# Patient Record
Sex: FEMALE | Race: WHITE | NOT HISPANIC OR LATINO | Employment: FULL TIME
[De-identification: names, ages, dates, MRNs, and addresses within clinical notes are randomized per-mention and may not be internally consistent; named-entity substitution may affect disease eponyms.]

---

## 2017-07-08 ENCOUNTER — HEALTH MAINTENANCE LETTER (OUTPATIENT)
Age: 42
End: 2017-07-08

## 2017-10-11 NOTE — PROGRESS NOTES
SUBJECTIVE:  Lisa Colbert is a 42 year old female presents for   Chief Complaint   Patient presents with     Physical     Patient here for annual physical      Ms. Colbert is doing well and has no specific complaints. She is doing well and has no complaints. Has changed jobs- worked for [employer] but stress was high. Now works for [employer] and is thriving. Exercises daily for 30 min am (gets up at 4:45 am). Supportive marital relationship and kids healthy. Was planning to complete BRCA testing (aunt with BRCA + breast cancer) but has reconsidered given Trump administration overtures to repealing [place] prohibition on discriminating for a pre-existing condition. Discussed need for annual mammography.    Medications and allergies were reviewed by me today.   Current Outpatient Prescriptions   Medication     Multiple Vitamin (MULTIVITAMINS PO)     No current facility-administered medications for this visit.        Family History  Paternal aunt with BRCA-1  Father  of stomach cancer 18 years ago  Mother went through menopause at 44    SH    Healthy kids  Works at [employer]  No tobacco  Minimal alcohol    Review Of Systems    Constitutional: no fevers, chills, night sweats or unintentional weight change   Eyes: vision change- needs new Rx for glasses, diplopia or red eyes   Ears, Nose, Mouth, Throat: no tinnitus or hearing change, no epistaxis or nasal discharge, no oral lesions, throat clear   Cardiovascular: no chest pain, palpitations, or pain with walking, no orthopnea or PND   Respiratory: no dyspnea, cough, shortness of breath or wheezing   GI: no nausea, vomiting, diarrhea or constipation, no abdominal pain   : no change in urine, no dysuria or hematuria, no sexual dysfunction .18-21 d menstrual cycle with cramps/bloating but some variation.  Musculoskeletal: no joint or muscle pain or swelling   Integumentary: no concerning lesions or moles   Neuro: no loss of strength or  "sensation, no numbness or tingling, no tremor, no dizziness, no headache   Endo: no polyuria or polydipsia, no temperature intolerance   Heme/Lymph: no concerning bumps, no bleeding problems   Allergy: no environmental allergies   Psych: no depression or anxiety, no sleep problems      PHYSICAL EXAM:  /78  Pulse 81  Temp 98.1  F (36.7  C) (Oral)  Ht 1.652 m (5' 5.04\")  Wt 73.4 kg (161 lb 14.4 oz)  SpO2 98%  Breastfeeding? No  BMI 26.91 kg/m2      Constitutional: no distress, comfortable, pleasant   Eyes: anicteric, normal extra-ocular movements   Ears, Nose and Throat: tympanic membranes clear, nose clear and free of lesions, throat clear, neck supple with full range of motion, no thyromegaly.   Cardiovascular: regular rate and rhythm, normal S1 and S2, no murmurs, rubs or gallops, peripheral pulses full and symmetric   Respiratory: clear to auscultation, no wheezes or crackles, normal breath sounds   Gastrointestinal: positive bowel sounds, nontender, no hepatosplenomegaly, no masses   Musculoskeletal: full range of motion, no edema   Skin: 1 mole on upper right breast photographed as below for follow-up purposes, no jaundice     Neurological: cranial nerves intact, normal strength and sensation, reflexes at patella and biceps normal, normal gait, no tremor   Psychological: appropriate mood   Lymphatic: no cervical, axillary or inguinal lymphadenopathy    ASSESSMENT/PLAN:    1) HCM. Immunizations UTD- will get flu shot as family on Saturday. Pap nl with negative HPV testing in 2014- repeat in 2019. Breast screening with Mammography today- still concerned about her risk given FH of BRCA-1. Given the current political uncertainly around the effects of a pre-existing condition, opts out of BRCA-1/2 screening this year. Diet/exercise excellent. Work/life blend is good. PHQ-2 Score: 0  The 10-year ASCVD risk score (Allenbj SANTANA Jr, et al., 2013) is: 0.5%    Values used to calculate the score:      Age: 42 " years      Sex: Female      Is Non- : No      Diabetic: No      Tobacco smoker: No      Systolic Blood Pressure: 111 mmHg      Is BP treated: No      HDL Cholesterol: 55 mg/dL      Total Cholesterol: 196 mg/dL  Check hgb given ongoing menstruation. Recheck lipids then q5y if remain nl.  2) H/O Diverticulitis. No recurrence. No changes.        Geovani Nowak

## 2017-10-12 ENCOUNTER — OFFICE VISIT (OUTPATIENT)
Dept: INTERNAL MEDICINE | Facility: CLINIC | Age: 42
End: 2017-10-12

## 2017-10-12 VITALS
TEMPERATURE: 98.1 F | WEIGHT: 161.9 LBS | DIASTOLIC BLOOD PRESSURE: 78 MMHG | BODY MASS INDEX: 26.98 KG/M2 | SYSTOLIC BLOOD PRESSURE: 111 MMHG | HEART RATE: 81 BPM | OXYGEN SATURATION: 98 % | HEIGHT: 65 IN

## 2017-10-12 DIAGNOSIS — Z00.00 ROUTINE GENERAL MEDICAL EXAMINATION AT A HEALTH CARE FACILITY: Primary | ICD-10-CM

## 2017-10-12 DIAGNOSIS — Z00.00 ROUTINE GENERAL MEDICAL EXAMINATION AT A HEALTH CARE FACILITY: ICD-10-CM

## 2017-10-12 LAB
CHOLEST SERPL-MCNC: 204 MG/DL
HDLC SERPL-MCNC: 64 MG/DL
HGB BLD-MCNC: 13.5 G/DL (ref 11.7–15.7)
LDLC SERPL CALC-MCNC: 122 MG/DL
NONHDLC SERPL-MCNC: 140 MG/DL
TRIGL SERPL-MCNC: 88 MG/DL

## 2017-10-12 ASSESSMENT — PAIN SCALES - GENERAL: PAINLEVEL: NO PAIN (0)

## 2017-10-12 NOTE — MR AVS SNAPSHOT
After Visit Summary   10/12/2017    Lisa Colbert    MRN: [id number]           Patient Information     Date Of Birth          1975        Visit Information        Provider Department      10/12/2017 7:00 AM Geovani Nowak MD [facility]        Today's Diagnoses     Routine general medical examination at a health care facility    -  1       Follow-ups after your visit        Follow-up notes from your care team     Return in about 1 year (around 10/12/2018).      Who to contact     Please call your clinic at [phone] to:    Ask questions about your health    Make or cancel appointments    Discuss your medicines    Learn about your test results    Speak to your doctor   If you have compliments or concerns about an experience at your clinic, or if you wish to file a complaint, please contact [facility] Patient Relations at [phone] or email us at [email or web address]         Additional Information About Your Visit        MyChart Information     [employer] gives you secure access to your electronic health record. If you see a primary care provider, you can also send messages to your care team and make appointments. If you have questions, please call your primary care clinic.  If you do not have a primary care provider, please call [phone] and they will assist you.      [employer] is an electronic gateway that provides easy, online access to your medical records. With [employer], you can request a clinic appointment, read your test results, renew a prescription or communicate with your care team.     To access your existing account, please contact your [facility] or call [phone] for assistance.        Care EveryWhere ID     This is your Care EveryWhere ID. This could be used by other organizations to access your [facility] medical records  [id number]        Your Vitals Were     Pulse Temperature  "Height Pulse Oximetry Breastfeeding? BMI (Body Mass Index)    81 98.1  F (36.7  C) (Oral) 1.652 m (5' 5.04\") 98% No 26.91 kg/m2       Blood Pressure from Last 3 Encounters:   10/12/17 111/78   08/18/15 124/66   07/07/15 131/76    Weight from Last 3 Encounters:   10/12/17 73.4 kg (161 lb 14.4 oz)   08/18/15 71.6 kg (157 lb 12.8 oz)   11/19/14 71.5 kg (157 lb 11.2 oz)               Primary Care Provider Office Phone # Fax #    Geovani Don Nowak [phone]       [address] [address]        Equal Access to Services     SHOBHA SOUZA : Hadii angeline hunt hadasho Soluke, waaxda luqadaha, qaybta kaalmada adeegyada, ajay simmons . So [facility] [phone].    ATENCIÓN: Si habla español, tiene a brown disposición servicios gratuitos de asistencia lingüística. Jelly al [phone].    We comply with applicable federal civil rights laws and [place] laws. We do not discriminate on the basis of race, color, national origin, age, disability, sex, sexual orientation, or gender identity.            Thank you!     Thank you for choosing [facility]  for your care. Our goal is always to provide you with excellent care. Hearing back from our patients is one way we can continue to improve our services. Please take a few minutes to complete the written survey that you may receive in the mail after your visit with us. Thank you!             Your Updated Medication List - Protect others around you: Learn how to safely use, store and throw away your medicines at www.disposemymeds.org.          This list is accurate as of: 10/12/17  9:04 AM.  Always use your most recent med list.                   Brand Name Dispense Instructions for use Diagnosis    MULTIVITAMINS PO      Take 1 tablet by mouth daily    Routine general medical examination at a health care facility, Irregular menstrual cycle         "

## 2017-10-12 NOTE — NURSING NOTE
Chief Complaint   Patient presents with     Physical     Patient here for annual physical     Nirali Rodriguez LPN at 7:07 AM on 10/12/2017.

## 2018-10-10 ENCOUNTER — OFFICE VISIT (OUTPATIENT)
Dept: OPHTHALMOLOGY | Facility: CLINIC | Age: 43
End: 2018-10-10
Payer: COMMERCIAL

## 2018-10-10 DIAGNOSIS — S05.01XD INJ CONJUNCTIVA AND CORNEAL ABRASION W/O FB, RIGHT EYE, SUBS: Primary | ICD-10-CM

## 2018-10-10 RX ORDER — ERYTHROMYCIN 5 MG/G
0.5 OINTMENT OPHTHALMIC AT BEDTIME
COMMUNITY
End: 2019-02-11

## 2018-10-10 ASSESSMENT — SLIT LAMP EXAM - LIDS
COMMENTS: NORMAL
COMMENTS: NORMAL

## 2018-10-10 ASSESSMENT — TONOMETRY
OS_IOP_MMHG: 17
OD_IOP_MMHG: 16
IOP_METHOD: ICARE

## 2018-10-10 ASSESSMENT — EXTERNAL EXAM - LEFT EYE: OS_EXAM: NORMAL

## 2018-10-10 ASSESSMENT — VISUAL ACUITY
METHOD: SNELLEN - LINEAR
OD_SC: 20/20
OD_SC+: -2
OS_SC: 20/20

## 2018-10-10 ASSESSMENT — CONF VISUAL FIELD
OD_NORMAL: 1
OS_NORMAL: 1

## 2018-10-10 ASSESSMENT — EXTERNAL EXAM - RIGHT EYE: OD_EXAM: NORMAL

## 2018-10-10 NOTE — PROGRESS NOTES
Assessment/Plan  (S05.01XD) Inj conjunctiva and corneal abrasion w/o fb, right eye, subs  (primary encounter diagnosis)  Comment: Patient evaluated by urgent care, here for follow up today. Has been taking erythromycin korina qid OD  Plan: Discussed findings with patient. She is ok to stop erythromycin as condition appears to have resolved. Encouraged patient to continue using lubricating eye drops as needed in both eyes for comfort. Patient should RTC with any worsening of pain, redness, or with any new flashes, floaters, or vision changes. Patient encouraged to RTC regularly for dilated eye exams.       Complete documentation of historical and exam elements from today's encounter can  be found in the full encounter summary report (not reduplicated in this progress  note). I personally obtained the chief complaint(s) and history of present illness. I  confirmed and edited as necessary the review of systems, past medical/surgical  history, family history, social history, and examination findings as documented by  others; and I examined the patient myself. I personally reviewed the relevant tests,  images, and reports as documented above. I formulated and edited as necessary the  assessment and plan and discussed the findings and management plan with the  patient and family.    Raad Mcclellan, OD

## 2018-10-10 NOTE — MR AVS SNAPSHOT
After Visit Summary   10/10/2018    Lisa Colbert    MRN: [id number]           Patient Information     Date Of Birth          1975        Visit Information        Provider Department      10/10/2018 7:40 AM Raad Mcclellan OD M [facility]        Today's Diagnoses     Inj conjunctiva and corneal abrasion w/o fb, right eye, subs    -  1       Follow-ups after your visit        Follow-up notes from your care team     Return if symptoms worsen or fail to improve, for Comprehensive Exam.      Who to contact     Please call your clinic at [phone] to:    Ask questions about your health    Make or cancel appointments    Discuss your medicines    Learn about your test results    Speak to your doctor            Additional Information About Your Visit        [employer] Information     [employer] gives you secure access to your electronic health record. If you see a primary care provider, you can also send messages to your care team and make appointments. If you have questions, please call your primary care clinic.  If you do not have a primary care provider, please call [phone] and they will assist you.      [employer] is an electronic gateway that provides easy, online access to your medical records. With [employer], you can request a clinic appointment, read your test results, renew a prescription or communicate with your care team.     To access your existing account, please contact your [facility] or call [phone] for assistance.        Care EveryWhere ID     This is your Care EveryWhere ID. This could be used by other organizations to access your [facility] medical records  [id number]         Blood Pressure from Last 3 Encounters:   10/12/17 111/78   08/18/15 124/66   07/07/15 131/76    Weight from Last 3 Encounters:   10/12/17 73.4 kg (161 lb 14.4 oz)   08/18/15 71.6 kg (157 lb 12.8 oz)   11/19/14 71.5 kg (157 lb 11.2 oz)              Today, you  had the following     No orders found for display       Primary Care Provider Office Phone # Fax #    Geovani Don Nowak [phone] [address] [address]        Equal Access to Services     SHOBHA SOUZA : Hadii angeline ku hadgrego Soomaali, waaxda luqadaha, qaybta kaalmada adeegyada, ajay chowdhuryn john bond iris cason. So [facility] [phone].    ATENCIÓN: Si habla español, tiene a brown disposición servicios gratuitos de asistencia lingüística. Llame al [phone].    We comply with applicable federal civil rights laws and [place] laws. We do not discriminate on the basis of race, color, national origin, age, disability, sex, sexual orientation, or gender identity.            Thank you!     Thank you for choosing [facility]  for your care. Our goal is always to provide you with excellent care. Hearing back from our patients is one way we can continue to improve our services. Please take a few minutes to complete the written survey that you may receive in the mail after your visit with us. Thank you!             Your Updated Medication List - Protect others around you: Learn how to safely use, store and throw away your medicines at www.disposemymeds.org.          This list is accurate as of 10/10/18  8:25 AM.  Always use your most recent med list.                   Brand Name Dispense Instructions for use Diagnosis    erythromycin ophthalmic ointment    ROMYCIN     0.5 inches At Bedtime        MULTIVITAMINS PO      Take 1 tablet by mouth daily    Routine general medical examination at a health care facility, Irregular menstrual cycle

## 2018-10-10 NOTE — NURSING NOTE
Chief Complaints and History of Present Illnesses   Patient presents with     Eye Injury Both Eyes     HPI    Affected eye(s):  Right   Symptoms:     Blurred vision (Comment: from korina, otherwise no issues per pt)   No tearing   No itching   No burning   No photophobia   No eye discharge         Do you have eye pain now?:  No      Comments:    Patient notes that she injured her RE on Saturday with goggles, was told she has K abrasion, using EES korina Retana October 10, 2018 7:39 AM

## 2019-01-22 ENCOUNTER — PATIENT OUTREACH (OUTPATIENT)
Dept: CARE COORDINATION | Facility: CLINIC | Age: 44
End: 2019-01-22

## 2019-02-11 ENCOUNTER — OFFICE VISIT (OUTPATIENT)
Dept: INTERNAL MEDICINE | Facility: CLINIC | Age: 44
End: 2019-02-11
Payer: COMMERCIAL

## 2019-02-11 ENCOUNTER — ANCILLARY PROCEDURE (OUTPATIENT)
Dept: MAMMOGRAPHY | Facility: CLINIC | Age: 44
End: 2019-02-11
Payer: COMMERCIAL

## 2019-02-11 VITALS
OXYGEN SATURATION: 97 % | DIASTOLIC BLOOD PRESSURE: 84 MMHG | BODY MASS INDEX: 26.93 KG/M2 | HEART RATE: 71 BPM | SYSTOLIC BLOOD PRESSURE: 123 MMHG | WEIGHT: 162 LBS

## 2019-02-11 DIAGNOSIS — Z12.31 VISIT FOR SCREENING MAMMOGRAM: ICD-10-CM

## 2019-02-11 DIAGNOSIS — Z00.00 HEALTHCARE MAINTENANCE: Primary | ICD-10-CM

## 2019-02-11 DIAGNOSIS — N92.1 MENORRHAGIA WITH IRREGULAR CYCLE: ICD-10-CM

## 2019-02-11 DIAGNOSIS — R03.0 ELEVATED BP WITHOUT DIAGNOSIS OF HYPERTENSION: ICD-10-CM

## 2019-02-11 ASSESSMENT — PAIN SCALES - GENERAL: PAINLEVEL: NO PAIN (0)

## 2019-02-11 NOTE — PROGRESS NOTES
SUBJECTIVE:  Lisa Colbert is a 43 year old female presents for   Chief Complaint   Patient presents with     Physical     Pt is here for annual physical with a pap.       No new symptoms or concerns. Healthy diet, exercise, and wellness regimen. . Safe at home. Kids healthy.     Medications and allergies were reviewed by me today.   Current Outpatient Medications   Medication     Multiple Vitamin (MULTIVITAMINS PO)     No current facility-administered medications for this visit.      Family History  Paternal aunt with BRCA-1--> not testing related to concerns over impact on insurance  Father  of stomach cancer 19 years ago  Mother went through menopause at 44     SH    Healthy kids  Works at [employer]  No tobacco  Minimal alcohol    Answers for HPI/ROS submitted by the patient on 2019   General Symptoms: No  Skin Symptoms: No  HENT Symptoms: No  EYE SYMPTOMS: No  HEART SYMPTOMS: No  LUNG SYMPTOMS: No  INTESTINAL SYMPTOMS: No  URINARY SYMPTOMS: No  GYNECOLOGIC SYMPTOMS: No  BREAST SYMPTOMS: No  SKELETAL SYMPTOMS: No  BLOOD SYMPTOMS: No  NERVOUS SYSTEM SYMPTOMS: No  MENTAL HEALTH SYMPTOMS: No    PHYSICAL EXAM:  /84   Pulse 71   Wt 73.5 kg (162 lb)   SpO2 97%   Breastfeeding? No   BMI 26.93 kg/m        Constitutional: no distress, comfortable, pleasant   Eyes: anicteric, normal extra-ocular movements   Ears, Nose and Throat: tympanic membranes clear, nose clear and free of lesions, throat clear, neck supple with full range of motion, no thyromegaly.   Cardiovascular: regular rate and rhythm, normal S1 and S2, no murmurs, rubs or gallops, peripheral pulses full and symmetric   Clinical Breast Exam- fibrocystic changes, no dominant masses.  Respiratory: clear to auscultation, no wheezes or crackles, normal breath sounds   Gastrointestinal: positive bowel sounds, nontender, no hepatosplenomegaly, no masses   Musculoskeletal: full range of motion, no edema   Skin: no  concerning lesions, no jaundice. 1.5 mm right upper outer breast pigmented nevus unchanged.  Neurological: normal strength and sensation, reflexes at patella and biceps normal, normal gait, no tremor   Psychological: appropriate mood   Lymphatic: no cervical, axillary or inguinal lymphadenopathy      ASSESSMENT/PLAN:    1) HCM. Immunizations UTD. Dental and eyecare UTD. Healthy diet/exercise/fitness levels. Mammo done today. Pap with HPV co-testing done today.   2) Heavy menses. Check hgb and ferritin. Cycle varies. Mother went through menopause at age 44 so may be beginning this phase.  3) Borderline BP per new guidelines. Suspect related to stressful driving/weather but will get BMP and follow home readings.      Geovani Nowak

## 2019-02-11 NOTE — NURSING NOTE
Chief Complaint   Patient presents with     Physical     Pt is here for annual physical with a pap.      Lazara Machado LPN at 2:07 PM on 2/11/2019.

## 2019-02-11 NOTE — PATIENT INSTRUCTIONS
[facility] [place] Medication Refill Request Information:  * Please contact your pharmacy regarding ANY request for medication refills.  ** [facility] Prescription Fax = [phone]  * Please allow 3 business days for routine medication refills.  * Please allow 5 business days for controlled substance medication refills.     [facility] [place] Test Result notification information:  *You will be notified with in 7-10 days of your appointment day regarding the results of your test.  If you are on MyChart you will be notified as soon as the provider has reviewed the results and signed off on them.    [facility] [place]: [phone]     Lab: [phone]

## 2019-02-14 LAB
COPATH REPORT: NORMAL
PAP: NORMAL

## 2019-02-18 LAB
FINAL DIAGNOSIS: NORMAL
HPV HR 12 DNA CVX QL NAA+PROBE: NEGATIVE
HPV16 DNA SPEC QL NAA+PROBE: NEGATIVE
HPV18 DNA SPEC QL NAA+PROBE: NEGATIVE
SPECIMEN DESCRIPTION: NORMAL
SPECIMEN SOURCE CVX/VAG CYTO: NORMAL

## 2019-10-04 ENCOUNTER — HEALTH MAINTENANCE LETTER (OUTPATIENT)
Age: 44
End: 2019-10-04

## 2020-11-08 ENCOUNTER — HEALTH MAINTENANCE LETTER (OUTPATIENT)
Age: 45
End: 2020-11-08

## 2021-09-11 ENCOUNTER — HEALTH MAINTENANCE LETTER (OUTPATIENT)
Age: 46
End: 2021-09-11

## 2022-01-01 ENCOUNTER — HEALTH MAINTENANCE LETTER (OUTPATIENT)
Age: 47
End: 2022-01-01

## 2022-06-20 ENCOUNTER — OFFICE VISIT (OUTPATIENT)
Dept: INTERNAL MEDICINE | Facility: CLINIC | Age: 47
End: 2022-06-20
Payer: COMMERCIAL

## 2022-06-20 VITALS
SYSTOLIC BLOOD PRESSURE: 101 MMHG | BODY MASS INDEX: 26.82 KG/M2 | HEART RATE: 89 BPM | DIASTOLIC BLOOD PRESSURE: 66 MMHG | HEIGHT: 65 IN | WEIGHT: 161 LBS | OXYGEN SATURATION: 97 % | RESPIRATION RATE: 16 BRPM

## 2022-06-20 DIAGNOSIS — N92.4 EXCESSIVE BLEEDING IN PREMENOPAUSAL PERIOD: ICD-10-CM

## 2022-06-20 DIAGNOSIS — Z12.11 SCREENING FOR COLON CANCER: ICD-10-CM

## 2022-06-20 DIAGNOSIS — Z12.31 ENCOUNTER FOR SCREENING MAMMOGRAM FOR BREAST CANCER: Primary | ICD-10-CM

## 2022-06-20 DIAGNOSIS — Z00.00 HEALTHCARE MAINTENANCE: ICD-10-CM

## 2022-06-20 PROCEDURE — 99386 PREV VISIT NEW AGE 40-64: CPT | Mod: 25 | Performed by: INTERNAL MEDICINE

## 2022-06-20 PROCEDURE — 90471 IMMUNIZATION ADMIN: CPT | Performed by: INTERNAL MEDICINE

## 2022-06-20 PROCEDURE — 90715 TDAP VACCINE 7 YRS/> IM: CPT | Performed by: INTERNAL MEDICINE

## 2022-06-20 NOTE — PROGRESS NOTES
Lisa Colbert received the TDAP immunization today in clinic at the request of Dr. Geovani Nowak. The immunization was given under the supervision of Dr. Geovani Nowak if assistance was needed. The patient does not report a history of adverse reactions associated with vaccine administration. The immunization site was cleaned with an alcohol prep wipe. The immunization was given without incident--see immunization list for administration details. No swelling or redness was observed at the site of injection after the immunization was given. The patient was advised to remain in fourth floor lobby of the [facility] for fifteen minutes after the injection in case of an averse reaction.     Teto Corea, EMT at 6:14 PM on 6/20/2022

## 2022-06-20 NOTE — NURSING NOTE
"Lisa Colbert is a 46 year old female patient that presents today in clinic for the following:    Chief Complaint   Patient presents with     Physical     Annual Visit     She reports no new health concerns.      The patient's allergies and medications were reviewed as noted. A set of vitals were recorded as noted without incident: /66 (BP Location: Right arm, Patient Position: Sitting, Cuff Size: Adult Regular)   Pulse 89   Resp 16   Ht 1.65 m (5' 4.96\")   Wt 73 kg (161 lb)   LMP 06/06/2022 (Approximate)   SpO2 97%   Breastfeeding No   BMI 26.82 kg/m  . The patient does not have any other questions for the provider.    Teto Corea, EMT at 5:29 PM on 6/20/2022  "

## 2022-06-20 NOTE — PROGRESS NOTES
SUBJECTIVE:  Lisa Colbert is a 46 year old female presents for   Chief Complaint   Patient presents with     Physical     Annual Visit     She reports no new health concerns.      She has been doing well. ROS unremarkable as below. Long discussion on concerns about BRCA risk- could be 50% as paternal aunt had it and dad  of stomach cancer. Concerned about insurance impacts.   Medications and allergies were reviewed by me today.   Current Outpatient Medications   Medication     Multiple Vitamin (MULTIVITAMINS PO)     No current facility-administered medications for this visit.     PMH  H/O diverticulosis    Family History  Paternal aunt with BRCA-1--> not testing related to concerns over impact on insurance  Father  of stomach cancer over 20 years ago  Mother went through menopause at 44     SH    Healthy kids  Works at [employer]  No tobacco  Minimal alcohol      Review Of Systems    Constitutional: no fevers, chills, night sweats or unintentional weight change   Eyes: no vision change, diplopia or red eyes   Ears, Nose, Mouth, Throat: no tinnitus or hearing change, no epistaxis or nasal discharge, no oral lesions, throat clear   Cardiovascular: no chest pain, palpitations, or pain with walking, no orthopnea or PND   Respiratory: no dyspnea, cough, shortness of breath or wheezing   GI: no nausea, vomiting, diarrhea or constipation, no abdominal pain   : no change in urine, no dysuria or hematuria, no sexual dysfunction   Musculoskeletal: no joint or muscle pain or swelling   Integumentary: no concerning lesions or moles   Neuro: no loss of strength or sensation, no numbness or tingling, no tremor, no dizziness, no headache   Endo: no polyuria or polydipsia, no temperature intolerance   Heme/Lymph: no concerning bumps, no bleeding problems   Allergy: no environmental allergies   Psych: no depression or anxiety, no sleep problems      PHYSICAL EXAM:  /66 (BP Location: Right arm,  "Patient Position: Sitting, Cuff Size: Adult Regular)   Pulse 89   Resp 16   Ht 1.65 m (5' 4.96\")   Wt 73 kg (161 lb)   LMP 06/06/2022 (Approximate)   SpO2 97%   Breastfeeding No   BMI 26.82 kg/m        Constitutional: no distress, comfortable, pleasant   Eyes: anicteric, normal extra-ocular movements   Ears, Nose and Throat: tympanic membranes clear, nose clear and free of lesions, throat clear, neck supple with full range of motion, no thyromegaly.   Cardiovascular: regular rate and rhythm, normal S1 and S2, no murmurs, rubs or gallops, peripheral pulses full and symmetric   Respiratory: clear to auscultation, no wheezes or crackles, normal breath sounds   Gastrointestinal: positive bowel sounds, nontender, no hepatosplenomegaly, no masses   Musculoskeletal: full range of motion, no edema   Skin: no concerning lesions, no jaundice   Neurological: cranial nerves intact, normal strength and sensation, reflexes at patella and biceps normal, normal gait, no tremor   Psychological: appropriate mood   Lymphatic: no cervical, axillary or inguinal lymphadenopathy      ASSESSMENT/PLAN:  1) HCM. Pap UTD (primary HPV or co-test due in 2024). Mammo due- high risk screening has been recommended due to concerns about family history as above. Has not had BRCA-1/2 testing due to concerns about impact on insurance. Breast MRI alternating with mammography and preventive therapy would be recommended if positive. She is reconsidering- may call [employer] insurance carrier with questions. May consider DIY genetic testing such as [employer] services. Colonoscopy recommended for colon cancer screening and ordered. HCV screening due and ordered. Immunizations reviewed- TDAP given. Healthy lifestyle with 5-6/wk exercise and healthy food choices. BMI 26.8- ideal weight in 150 range. Given heavy menses- ferritin testing. Fasting lipids ordered (last was 5 yrs ago).      Geovani Nowak MD            "

## 2022-06-20 NOTE — PATIENT INSTRUCTIONS
To schedule an fasting lab appointment at the [facility], please call [phone]. To schedule a mammogram, please call radiology scheduling at [phone].

## 2022-06-22 ENCOUNTER — HOSPITAL ENCOUNTER (OUTPATIENT)
Facility: AMBULATORY SURGERY CENTER | Age: 47
End: 2022-06-22
Attending: INTERNAL MEDICINE
Payer: COMMERCIAL

## 2022-06-22 ENCOUNTER — TELEPHONE (OUTPATIENT)
Dept: GASTROENTEROLOGY | Facility: CLINIC | Age: 47
End: 2022-06-22

## 2022-06-22 NOTE — TELEPHONE ENCOUNTER
Screening Questions  BlueKIND OF PREP RedLOCATION [review exclusion criteria] GreenSEDATION TYPE      1. Are you active on mychart? YES      2. Ordering/Referring Provider: Geovani Nowak MD     3. What insurance is in the chart? [employer]    4. Do you have a legal guardian or medical Power of ?  Are you able to give consent for your medical care? N   (Sedation review/consideration needed)      5.    BMI 26.82 [BMI OVER 40-EXTENDED PREP]  If greater than 40 review exclusion criteria [PAC APPT IF @ [facility]]       6.  Have you had a positive covid test in the last 90 days? N     7.   Respiratory Screening :  [If yes to any of the following HOSPITAL setting only]                     Do you use daily home oxygen? N  Do you have mod to severe Obstructive Sleep Apnea? N [OKAY @ [facility] PH [facility]]                   Do you have Pulmonary Hypertension? N                   Do you have UNCONTROLLED asthma? n        8.   Have you had a heart or lung transplant? N      9.   Are you currently on dialysis?  N [ If yes, G-PREP & HOSPITAL setting only]      10.   Do you have chronic kidney disease? N [ If yes, G-PREP ]     11.  Have you had a stroke or Transient ischemic attack (TIA - aka  mini stroke ) within 6 months?  N (If yes, please review exclusion criteria)     12.   In the past 6 months, have you had any heart related issues including cardiomyopathy or heart attack? N          If yes, did it require cardiac stenting or other implantable device? n       13.   Do you have any implantable devices in your body (pacemaker, defib, LVAD)? N  (If yes, please review exclusion criteria)     14.   Do you take nitroglycerin? N           If yes, how often? n  (if yes, HOSPITAL setting ONLY)     15.   Are you currently taking any blood thinners? N          [IF YES, INFORM PATIENT TO FOLLOW UP W/ ORDERING PROVIDER FOR BRIDGING INSTRUCTIONS]      16.   Do you have a diagnosis of diabetes? N [ If yes, G-PREP ]      17.   [FEMALES] Are you currently pregnant? N   If yes, how many weeks? N     18.   Are you taking any prescription pain medications on a routine schedule?  N  [ If yes, EXTENDED PREP.] [If yes, MAC]     19.   Do you have any chemical dependencies such as alcohol, street drugs, or methadone?  N [If yes, MAC]     20.   Do you have any history of post-traumatic stress syndrome, severe anxiety or history of psychosis?  N [If yes, MAC]     21.   Do you transfer independently?  Y     22.  On a regular basis do you go 3-5 days between bowel movements? N [ If yes, EXTENDED PREP.]     23.   Preferred LOCAL Pharmacy for Pre Prescription       [facility] #[id number] - [address]  AT [facility] OF [address]          Scheduling Details      Caller :  Lisa   (Please ask for phone number if not scheduled by patient)    Type of Procedure Scheduled: Lower Endoscopy [Colonoscopy]  Which Colonoscopy Prep was Sent?: MPREP    Surgeon: MARILEE  Date of Procedure: 09/02  Location: [facility] [place]    Sedation Type: CS    Conscious Sedation- Needs  for 6 hours after the procedure  MAC/General-Needs  for 24 hours after procedure    Pre-op Required at [facility] [place], [place] and OR for MAC sedation: N  (advise patient they will need a pre-op prior to procedure -)      Informed patient they will need an adult  Y  Cannot take any type of public or medical transportation alone    Pre-Procedure [facility] or Externally: HOME    Confirmed Nurse will call to complete assessment Y    Additional comments:

## 2022-07-07 ENCOUNTER — TELEPHONE (OUTPATIENT)
Dept: GASTROENTEROLOGY | Facility: CLINIC | Age: 47
End: 2022-07-07

## 2022-07-07 NOTE — TELEPHONE ENCOUNTER
Caller: Lisa Colbert      Procedure: Colonoscopy    Date, Location, and Surgeon of Procedure Cancelled: 09/02/2022 at [facility] [place] with Dr. Martin    Ordering Provider:Geovani Nowak MD    Reason for cancel (please be detailed, any staff messages or encounters to note?): Dr. Martin will be out on 09/02.        Rescheduled: Yes     If rescheduled:    Date: 09/16/2022   Location: [facility] [place]   Prep Resent: N(changes to prep?)   Covid Test Rescheduled: N   Note any change or update to original order/sedation: N

## 2022-08-08 ENCOUNTER — ANCILLARY PROCEDURE (OUTPATIENT)
Dept: MAMMOGRAPHY | Facility: CLINIC | Age: 47
End: 2022-08-08
Attending: INTERNAL MEDICINE
Payer: COMMERCIAL

## 2022-08-08 DIAGNOSIS — Z12.31 ENCOUNTER FOR SCREENING MAMMOGRAM FOR BREAST CANCER: ICD-10-CM

## 2022-08-08 PROCEDURE — 77067 SCR MAMMO BI INCL CAD: CPT

## 2022-08-08 PROCEDURE — 77063 BREAST TOMOSYNTHESIS BI: CPT

## 2022-09-09 ENCOUNTER — TELEPHONE (OUTPATIENT)
Dept: GASTROENTEROLOGY | Facility: CLINIC | Age: 47
End: 2022-09-09

## 2022-09-09 DIAGNOSIS — Z12.11 SPECIAL SCREENING FOR MALIGNANT NEOPLASMS, COLON: Primary | ICD-10-CM

## 2022-09-09 RX ORDER — BISACODYL 5 MG/1
TABLET, DELAYED RELEASE ORAL
Qty: 4 TABLET | Refills: 0 | Status: SHIPPED | OUTPATIENT
Start: 2022-09-09

## 2022-09-09 NOTE — TELEPHONE ENCOUNTER
Patient scheduled for colonoscopy on 9.16.22.     Covid test scheduled ?. Discuss at home test option.     Arrival time: 0755    Facility location: [facility] [place] [facility]    Sedation type: CS    Indication for procedure: Screening    Anticoagulations? No     Bowel prep recommendation: Standard golytely d/t mag citrate recall    Golytely prep sent to [facility]. Prep instructions sent via [employer]    Pre visit planning completed.    Makenzie Ferguson RN

## 2022-09-09 NOTE — TELEPHONE ENCOUNTER
Pre assessment call placed to patient to go over colonoscopy prep.    Patient states needs to reschedule due to work conflict.    Message sent to scheduling to cancel per patient request.

## 2022-09-09 NOTE — TELEPHONE ENCOUNTER
Caller: reached out and patient will call to reschedule.    Procedure: colonoscopy- CS sedation    Date, Location, and Surgeon of Procedure Cancelled: 09/16 [facility]    Ordering Provider:Geovani Nowak MD    Reason for cancel (please be detailed, any staff messages or encounters to note?):     ----- Message from Makenzie Ferguson RN sent at 9/9/2022 12:13 PM CDT -----  Regarding: cancel  Hello,    Please cancel this patient's colonoscopy on 9.16.22 per her request due to work conflict.    Thank you!    Makenzie    Rescheduled: No, patient will call to reschedule later.

## 2022-10-30 ENCOUNTER — HEALTH MAINTENANCE LETTER (OUTPATIENT)
Age: 47
End: 2022-10-30

## 2022-11-01 ENCOUNTER — TELEPHONE (OUTPATIENT)
Dept: GASTROENTEROLOGY | Facility: CLINIC | Age: 47
End: 2022-11-01

## 2022-11-01 NOTE — TELEPHONE ENCOUNTER
Caller: Lisa Colbert      Procedure: COLONOSCOPY    Date, Location, and Surgeon of Procedure Cancelled: 9/16, [facility]    Ordering Provider:Geovani Nowak MD      Reason for cancel (please be detailed, any staff messages or encounters to note?): PATIENT REACHING OUT TO RESCHEDULE COLONOSCOPY        Rescheduled: Y     If rescheduled:    Date: 1/16   [place] [facility]   Prep Resent: RESENT(changes to prep?)   Covid Test Rescheduled: HOME TEST   Note any change or update to original order/sedation: N/A

## 2022-11-15 ENCOUNTER — TELEPHONE (OUTPATIENT)
Dept: GASTROENTEROLOGY | Facility: CLINIC | Age: 47
End: 2022-11-15

## 2022-11-15 NOTE — TELEPHONE ENCOUNTER
Caller:     Procedure: COLON    Date, Location, and Surgeon of Procedure Cancelled: 1/16 [facility]    Ordering Provider:ESTER RICO    Reason for cancel (please be detailed, any staff messages or encounters to note?): UMP HOLIDAY        Rescheduled: Y     If rescheduled:    Date: 2/3   Location: [facility]   Prep Resent: Y(changes to prep?)   Covid Test Rescheduled: HOME   Note any change or update to original order/sedation:

## 2023-01-24 ENCOUNTER — TELEPHONE (OUTPATIENT)
Dept: GASTROENTEROLOGY | Facility: CLINIC | Age: 48
End: 2023-01-24

## 2023-01-24 DIAGNOSIS — Z12.11 SPECIAL SCREENING FOR MALIGNANT NEOPLASMS, COLON: Primary | ICD-10-CM

## 2023-01-24 NOTE — TELEPHONE ENCOUNTER
Attempted to contact patient regarding upcoming Colonoscopy  procedure on 2/3/2023 for pre assessment questions. Pt was in a meeting and will call back     Writer advised pt of call back number [phone] Option #4    Discuss Covid policy and designated  policy.    Reyna Trivedi RN  Endoscopy Procedure Pre Assessment RN                Patient scheduled for Colonoscopy  on 2/3/2023.     Discuss Covid policy.     Pre op exam scheduled: N/A    Arrival time: 1200    [facility] [address] [place] [address], [address]th Floor, [place] [address]    Sedation type: Conscious sedation     Anticoagulations? No    Electronic implanted devices? No    Diabetic? No    Indication for procedure: screening    Bowel prep recommendation: Standard Golytely     Prep instructions sent via [employer] Bowel prep script sent to    [facility] #[id number] - [place], [place] - [address] [place] [address]  AT [facility] OF [address]    Pre visit planning completed.    Reyna Trivedi RN  Endoscopy Procedure Pre Assessment RN

## 2023-01-27 RX ORDER — BISACODYL 5 MG
TABLET, DELAYED RELEASE (ENTERIC COATED) ORAL
Qty: 4 TABLET | Refills: 0 | Status: SHIPPED | OUTPATIENT
Start: 2023-01-27

## 2023-01-27 NOTE — TELEPHONE ENCOUNTER
Second attempt for pre-assessment prior to upcoming colonoscopy     No answer.  Left message to return call [phone] #4    Bibi Patel RN  Endoscopy Procedure Pre Assessment RN

## 2023-01-31 ENCOUNTER — TELEPHONE (OUTPATIENT)
Dept: GASTROENTEROLOGY | Facility: CLINIC | Age: 48
End: 2023-01-31
Payer: COMMERCIAL

## 2023-01-31 NOTE — TELEPHONE ENCOUNTER
Caller: Lisa  Reason for Reschedule/Cancellation (please be detailed, any staff messages or encounters to note?): Patient      Prior to reschedule please review:    Ordering Provider:Eliu    Sedation per order:Protocol    Does patient have any ASC Exclusions, please identify?: N      Notes on Cancelled Procedure:    Procedure:Lower Endoscopy [Colonoscopy]     Date: 2/3/23    Location:[facility]; [address] [place] [address], [place] [address]    Surgeon: FELIPE Cristina        Rescheduled: Yes    Procedure: Lower Endoscopy [Colonoscopy]     Date: 4/26/23    Location:[facility]; [address] [place] [address], [place] [address]    Surgeon: Leventhal    Sedation Level Scheduled  CS,  Reason for Sedation Level Protocol    Prep/Instructions updated and sent: Yes

## 2023-04-12 NOTE — TELEPHONE ENCOUNTER
Rescheduled procedure    Attempted to contact patient regarding upcoming Colonoscopy  procedure on 4/26/23 for pre assessment questions. No answer.     Left message to return call to [phone] #4    Discuss Covid policy and designated  policy.    Pre op exam? N/A    Arrival time: 0645. Procedure time: 0745    [facility]; [address] [place] [address], 5th Floor, [place] [address]    Sedation type: Conscious sedation     Anticoagulants: No    Electronic implanted devices? No    Diabetic? No    Indication for procedure: screening colonoscopy    Bowel prep recommendation: Standard Golchazly      Verify patient has picked up prep.      Jo Ann Mehta, LIANET  Endoscopy Procedure Pre Assessment RN

## 2023-04-19 NOTE — TELEPHONE ENCOUNTER
Pre assessment questions completed for upcoming Colonoscopy  procedure scheduled on 4/26/2023    COVID policy reviewed.     Reviewed procedural arrival time 0645 and facility location [facility]; [address] [place] [address], [address]th Floor, [place] [address]    Designated  policy reviewed. Instructed to have someone stay 6 hours post procedure.     Anticoagulation/blood thinners? No    Electronic implanted devices? No    Diabetic? No    Writer reviewed when the dietary changes take place from the procedure prep instructions.Pt declined writer review the remaining portion of the instructions. Pt will call with any questions.    Patient verbalized understanding and had no questions or concerns at this time.    Reyna Trivedi RN  Endoscopy Procedure Pre Assessment RN

## 2023-04-26 ENCOUNTER — HOSPITAL ENCOUNTER (OUTPATIENT)
Facility: AMBULATORY SURGERY CENTER | Age: 48
Discharge: HOME | End: 2023-04-26
Attending: INTERNAL MEDICINE | Admitting: INTERNAL MEDICINE
Payer: COMMERCIAL

## 2023-04-26 VITALS
WEIGHT: 160 LBS | DIASTOLIC BLOOD PRESSURE: 60 MMHG | HEART RATE: 80 BPM | OXYGEN SATURATION: 100 % | HEIGHT: 65 IN | BODY MASS INDEX: 26.66 KG/M2 | RESPIRATION RATE: 14 BRPM | TEMPERATURE: 97.4 F | SYSTOLIC BLOOD PRESSURE: 110 MMHG

## 2023-04-26 DIAGNOSIS — Z12.11 COLON CANCER SCREENING: Primary | ICD-10-CM

## 2023-04-26 LAB
COLONOSCOPY: NORMAL
HCG UR QL: NEGATIVE
INTERNAL QC OK POCT: NORMAL
POCT KIT EXPIRATION DATE: NORMAL
POCT KIT LOT NUMBER: NORMAL

## 2023-04-26 PROCEDURE — 81025 URINE PREGNANCY TEST: CPT | Performed by: PATHOLOGY

## 2023-04-26 PROCEDURE — 45378 DIAGNOSTIC COLONOSCOPY: CPT | Mod: 33

## 2023-04-26 RX ORDER — SODIUM CHLORIDE, SODIUM LACTATE, POTASSIUM CHLORIDE, CALCIUM CHLORIDE 600; 310; 30; 20 MG/100ML; MG/100ML; MG/100ML; MG/100ML
INJECTION, SOLUTION INTRAVENOUS CONTINUOUS
Status: DISCONTINUED | OUTPATIENT
Start: 2023-04-26 | End: 2023-04-26 | Stop reason: HOSPADM

## 2023-04-26 RX ORDER — FENTANYL CITRATE 50 UG/ML
INJECTION, SOLUTION INTRAMUSCULAR; INTRAVENOUS DAILY PRN
Status: DISCONTINUED | OUTPATIENT
Start: 2023-04-26 | End: 2023-04-26 | Stop reason: HOSPADM

## 2023-04-26 RX ORDER — ONDANSETRON 2 MG/ML
4 INJECTION INTRAMUSCULAR; INTRAVENOUS
Status: DISCONTINUED | OUTPATIENT
Start: 2023-04-26 | End: 2023-04-26 | Stop reason: HOSPADM

## 2023-04-26 RX ORDER — PROCHLORPERAZINE MALEATE 10 MG
10 TABLET ORAL EVERY 6 HOURS PRN
Status: DISCONTINUED | OUTPATIENT
Start: 2023-04-26 | End: 2023-04-27 | Stop reason: HOSPADM

## 2023-04-26 RX ORDER — NALOXONE HYDROCHLORIDE 0.4 MG/ML
0.2 INJECTION, SOLUTION INTRAMUSCULAR; INTRAVENOUS; SUBCUTANEOUS
Status: DISCONTINUED | OUTPATIENT
Start: 2023-04-26 | End: 2023-04-27 | Stop reason: HOSPADM

## 2023-04-26 RX ORDER — NALOXONE HYDROCHLORIDE 0.4 MG/ML
0.4 INJECTION, SOLUTION INTRAMUSCULAR; INTRAVENOUS; SUBCUTANEOUS
Status: DISCONTINUED | OUTPATIENT
Start: 2023-04-26 | End: 2023-04-27 | Stop reason: HOSPADM

## 2023-04-26 RX ORDER — ONDANSETRON 2 MG/ML
4 INJECTION INTRAMUSCULAR; INTRAVENOUS EVERY 6 HOURS PRN
Status: DISCONTINUED | OUTPATIENT
Start: 2023-04-26 | End: 2023-04-27 | Stop reason: HOSPADM

## 2023-04-26 RX ORDER — LIDOCAINE 40 MG/G
CREAM TOPICAL
Status: DISCONTINUED | OUTPATIENT
Start: 2023-04-26 | End: 2023-04-26 | Stop reason: HOSPADM

## 2023-04-26 RX ORDER — FLUMAZENIL 0.1 MG/ML
0.2 INJECTION, SOLUTION INTRAVENOUS
Status: ACTIVE | OUTPATIENT
Start: 2023-04-26 | End: 2023-04-26

## 2023-04-26 RX ORDER — ONDANSETRON 4 MG/1
4 TABLET, ORALLY DISINTEGRATING ORAL EVERY 6 HOURS PRN
Status: DISCONTINUED | OUTPATIENT
Start: 2023-04-26 | End: 2023-04-27 | Stop reason: HOSPADM

## 2023-04-26 RX ADMIN — SODIUM CHLORIDE, SODIUM LACTATE, POTASSIUM CHLORIDE, CALCIUM CHLORIDE: 600; 310; 30; 20 INJECTION, SOLUTION INTRAVENOUS at 07:37

## 2023-04-26 NOTE — H&P
Lisa Colbert  [id number]  female  47 year old      Reason for procedure/surgery: colonoscopy    Patient Active Problem List   Diagnosis     AMA /  -->[facility] CNM care      (normal spontaneous vaginal delivery)     Postpartum care and examination immediately after delivery     Anterior neck pain     Anterior neck pain       Past Surgical History:    Past Surgical History:   Procedure Laterality Date     [facility] NONSPECIFIC PROCEDURE      tonsillar abcess drained       Past Medical History:   Past Medical History:   Diagnosis Date     Anterior neck pain      Diverticula, colon for 6 years    no flare up     Other anaphylactic reaction 2005    Diflucan, throat and laryngeal swelling, dramatic, relieved with Benadryl and Medrol dosepak       Social History:   Social History     Tobacco Use     Smoking status: Never     Smokeless tobacco: Never   Vaping Use     Vaping status: Not on file   Substance Use Topics     Alcohol use: No       Family History:   Family History   Problem Relation Age of Onset     Cancer Father         stomach     Breast Cancer Paternal Grandmother         dx'd age 40,  of uterine CA       Hypertension Paternal Grandfather      Cancer Paternal Aunt         Breast CA dx'd age 53, BRCA 1 positive     Glaucoma No family hx of      Macular Degeneration No family hx of        Allergies:   Allergies   Allergen Reactions     Diflucan [Fluconazole] Anaphylaxis     Ortho Tri-Cyclen, [Norgestimate-Eth Estradiol] Other (See Comments)     Very high blood pressure       Active Medications:   Current Outpatient Medications   Medication Sig Dispense Refill     Multiple Vitamin (MULTIVITAMINS PO) Take 1 tablet by mouth daily       bisacodyl (DULCOLAX) 5 MG EC tablet Take 2 tablets at 3 pm the day before your procedure. If your procedure is before 11 am, take 2 additional tablets at 11 pm. If your procedure is after 11 am, take 2 additional tablets at 6 am. For additional instructions  "refer to your colonoscopy prep instructions. 4 tablet 0     bisacodyl (DULCOLAX) 5 MG EC tablet Take as directed. One day before exam take 2 tablets at 3 PM. Take 2 tablets at 11 PM. 4 tablet 0     polyethylene glycol (GOLYTELY) 236 g suspension The night before the exam at 6 pm drink an 8-ounce glass every 15 minutes until the jug is half empty. If you arrive before 11 AM: Drink the other half of the Golytely jug at 11 PM night before procedure. If you arrive after 11 AM: Drink the other half of the Golytely jug at 6 AM day of procedure. For additional instructions refer to your colonoscopy prep instructions. 4000 mL 0     polyethylene glycol (GOLYTELY) 236 g suspension Take as directed. One day before exam fill the jug with water. Cover and shake until well mixed. At 6 PM start drinking an 8oz glass of mixture every 15 minutes until jug is 1/2 empty. Store remainder in the refrigerator.  At 11 PM Start drinking the other half of the Golytely jug. Drink one 8-ounce glass every 15 minutes until the jug is empty. 4000 mL 0       Systemic Review:   CONSTITUTIONAL: NEGATIVE for fever, chills, change in weight  ENT/MOUTH: NEGATIVE for ear, mouth and throat problems  RESP: NEGATIVE for significant cough or SOB  CV: NEGATIVE for chest pain, palpitations or peripheral edema    Physical Examination:   Vital Signs: /76 (BP Location: Right arm)   Pulse 95   Temp 97.7  F (36.5  C) (Temporal)   Resp 18   Ht 1.651 m (5' 5\")   Wt 72.6 kg (160 lb)   SpO2 100%   BMI 26.63 kg/m    GENERAL: healthy, alert and no distress  NECK: no adenopathy, no asymmetry, masses, or scars  RESP: Normal respiratory excursion   CV: regular rates and rhythm and no peripheral edema  ABDOMEN: soft, nontender and bowel sounds normal  MS: no gross musculoskeletal defects noted, no edema    Plan: Appropriate to proceed as scheduled.      Thomas M. Leventhal, MD  4/26/2023    PCP:  Geovani Nowak    "

## 2023-06-12 ENCOUNTER — OFFICE VISIT (OUTPATIENT)
Dept: INTERNAL MEDICINE | Facility: CLINIC | Age: 48
End: 2023-06-12
Payer: COMMERCIAL

## 2023-06-12 VITALS
HEIGHT: 65 IN | BODY MASS INDEX: 27.69 KG/M2 | SYSTOLIC BLOOD PRESSURE: 139 MMHG | DIASTOLIC BLOOD PRESSURE: 77 MMHG | OXYGEN SATURATION: 100 % | HEART RATE: 72 BPM | WEIGHT: 166.2 LBS

## 2023-06-12 DIAGNOSIS — L23.7 CONTACT DERMATITIS DUE TO POISON IVY: ICD-10-CM

## 2023-06-12 DIAGNOSIS — L03.011 CELLULITIS OF FINGER OF RIGHT HAND: Primary | ICD-10-CM

## 2023-06-12 PROCEDURE — 99213 OFFICE O/P EST LOW 20 MIN: CPT | Performed by: INTERNAL MEDICINE

## 2023-06-12 RX ORDER — TRIAMCINOLONE ACETONIDE 1 MG/G
CREAM TOPICAL 2 TIMES DAILY
Qty: 30 G | Refills: 1 | Status: SHIPPED | OUTPATIENT
Start: 2023-06-12

## 2023-06-12 RX ORDER — DOXYCYCLINE HYCLATE 100 MG
100 TABLET ORAL 2 TIMES DAILY
Qty: 14 TABLET | Refills: 0 | Status: SHIPPED | OUTPATIENT
Start: 2023-06-12 | End: 2023-06-19

## 2023-06-12 NOTE — NURSING NOTE
"Lisa Colbert is a 47 year old female patient that presents today in clinic for the following:    Chief Complaint   Patient presents with     Musculoskeletal Problem     Pt reports right thumb injury. Thumb has been red and swollen for about 3 weeks, does not remember any significant injury, had been cleaning out mother's house and gardening. Pt reports red dots appeared, and this is the most swollen it has gotten, thumb is painful and unable to bend. Does not radiate     The patient's allergies and medications were reviewed as noted. A set of vitals were recorded as noted without incident: /77 (BP Location: Right arm, Patient Position: Sitting, Cuff Size: Adult Regular)   Pulse 72   Ht 1.651 m (5' 5\")   Wt 75.4 kg (166 lb 3.2 oz)   SpO2 100%   BMI 27.66 kg/m  . The patient does not have any other questions for the provider.    Jaxon Prado, EMT 12:54 PM on 6/12/2023   "

## 2023-06-12 NOTE — PROGRESS NOTES
"S) Ms. Colbert is a 46 yo woman with a h/o diverticulitis who presents with a swollen painful right thumb. She has been clearing out her mother's place since her passing and also gardening quite a bit. Started with a thumb rash, bumps and severe itching. She used benadryl cream with some benefit. Then noted increased swelling and pain in past 48 hours. No improvement with ice and NSAIDs. No fevers or red streaks.     Current Outpatient Medications   Medication     bisacodyl (DULCOLAX) 5 MG EC tablet     bisacodyl (DULCOLAX) 5 MG EC tablet     doxycycline hyclate (VIBRA-TABS) 100 MG tablet     Multiple Vitamin (MULTIVITAMINS PO)     polyethylene glycol (GOLYTELY) 236 g suspension     polyethylene glycol (GOLYTELY) 236 g suspension     triamcinolone (KENALOG) 0.1 % external cream     No current facility-administered medications for this visit.     O) /77 (BP Location: Right arm, Patient Position: Sitting, Cuff Size: Adult Regular)   Pulse 72   Ht 1.651 m (5' 5\")   Wt 75.4 kg (166 lb 3.2 oz)   SpO2 100%   BMI 27.66 kg/m    Well appearing  Swollen right thumb with mild erythema, receding \"bumps\", and area of \"hangnail\" open at cuticle. Full ROM of the PIP and DIP without pain but swelling tender on palpation. No streaks. Slightly warm.    A/P) 1) Thumb swelling/pain. Initial rash and pruritis c/w contact dermatitis complicated now by secondary cellulitis. Likely portal of entry is the hangnail wound. Doxycycline 100 bid for 7d. Will cool down the contact dermatitis with triamcinolone topical 0.1% and a slight compression wrap. She is aware of the red flag signs to watch for that would require re-evaluation (fever, streaks, severe pain, limited ROM, etc).     20 minutes spent on the date of the encounter performing chart review, history and exam, documentation and further activities as noted above.    Bernardino Nowak MD  Primary Care Center  [facility]        "

## 2023-06-19 ENCOUNTER — HOSPITAL ENCOUNTER (EMERGENCY)
Facility: HOSPITAL | Age: 48
Discharge: LEFT BEFORE DISCHARGE | End: 2023-06-19
Payer: COMMERCIAL

## 2023-06-19 VITALS
HEART RATE: 85 BPM | SYSTOLIC BLOOD PRESSURE: 155 MMHG | TEMPERATURE: 97.9 F | BODY MASS INDEX: 27.77 KG/M2 | DIASTOLIC BLOOD PRESSURE: 82 MMHG | OXYGEN SATURATION: 100 % | WEIGHT: 166.89 LBS | RESPIRATION RATE: 16 BRPM

## 2023-06-19 NOTE — ED TRIAGE NOTES
Pt has been treated with antibiotics for cellulitis on her right thumb. Pain is getting worse. Thumb is red, swollen, and warm to the touch.      Triage Assessment     Row Name 06/19/23 2338       Triage Assessment (Adult)    Airway WDL WDL       Respiratory WDL    Respiratory WDL WDL       Skin Circulation/Temperature WDL    Skin Circulation/Temperature WDL X;temperature    Skin Temperature warm  thumb       Cardiac WDL    Cardiac WDL WDL       Peripheral/Neurovascular WDL    Peripheral Neurovascular WDL WDL       Cognitive/Neuro/Behavioral WDL    Cognitive/Neuro/Behavioral WDL WDL

## 2023-06-23 ENCOUNTER — OFFICE VISIT (OUTPATIENT)
Dept: INTERNAL MEDICINE | Facility: CLINIC | Age: 48
End: 2023-06-23
Payer: COMMERCIAL

## 2023-06-23 VITALS
HEART RATE: 86 BPM | WEIGHT: 165 LBS | DIASTOLIC BLOOD PRESSURE: 88 MMHG | BODY MASS INDEX: 27.46 KG/M2 | OXYGEN SATURATION: 99 % | SYSTOLIC BLOOD PRESSURE: 142 MMHG

## 2023-06-23 DIAGNOSIS — L03.011 CELLULITIS OF RIGHT THUMB: Primary | ICD-10-CM

## 2023-06-23 PROCEDURE — 99213 OFFICE O/P EST LOW 20 MIN: CPT | Performed by: HOSPITALIST

## 2023-06-23 RX ORDER — CEPHALEXIN 500 MG/1
1000 CAPSULE ORAL 3 TIMES DAILY
Qty: 18 CAPSULE | Refills: 0 | Status: SHIPPED | OUTPATIENT
Start: 2023-06-23

## 2023-06-23 ASSESSMENT — ENCOUNTER SYMPTOMS
CHILLS: 0
FEVER: 0
WOUND: 1
PARESTHESIAS: 0

## 2023-06-23 NOTE — NURSING NOTE
Lisa Colbert is a 47 year old female that presents in clinic today for the following:     Chief Complaint   Patient presents with     Follow Up     Pt here for follow up on thumb injury       The patient's allergies and medications were reviewed. The patient's vitals were obtained without incident. The patient does not have any other questions for the provider.     Devon Hough, EMT at 2:26 PM on 6/23/2023.  Primary Care Clinic: [phone]

## 2023-06-23 NOTE — ASSESSMENT & PLAN NOTE
Improving, day 4 of Keflex. No tenderness, mild edema of DIP and mild erythema. No ulcerations.   - Continue keflex for the 7 days. If still some redness and swelling, may complete an additional 3 days of keflex to complete a full 10 day course.

## 2023-06-23 NOTE — PATIENT INSTRUCTIONS
- Continue keflex for the 7 days. If still some redness and swelling, may complete an additional 3 days of keflex to complete a full 10 day course.       Follow up if worse.

## 2023-06-23 NOTE — PROGRESS NOTES
Assessment/Plan  Problem List Items Addressed This Visit        Musculoskeletal and Integumentary    Cellulitis of right thumb - Primary     Improving, day 4 of Keflex. No tenderness, mild edema of DIP and mild erythema. No ulcerations.   - Continue keflex for the 7 days. If still some redness and swelling, may complete an additional 3 days of keflex to complete a full 10 day course.          Relevant Medications    cephALEXin (KEFLEX) 500 MG capsule       No results found for any visits on 06/23/23.    Health Maintenance Due   Topic Date Due     ADVANCE CARE PLANNING  Never done     HEPATITIS B IMMUNIZATION (1 of 3 - 3-dose series) Never done     HEPATITIS C SCREENING  Never done     LIPID  10/12/2022     PHQ-2 (once per calendar year)  01/01/2023     YEARLY PREVENTIVE VISIT  06/20/2023           Subjective  Patient had presented to the ER at [facility] on 6/19/23 when she noticed pains and swelling over a day of her right thumb. Patient has been gardening. Patient has noted some poison oak in her garden this year. She was given keflex for 7 days. Mentions swelling has been improving. No longer has the pin prick pains in her finger that she had in the ER. She is able to bend her finger but still has a little tightness from swelling. No fevers or chills.       Review of Systems   Constitutional: Negative for chills and fever.   Skin: Positive for rash and wound.   Neurological: Negative for paresthesias.   Psychiatric/Behavioral: Negative for mood changes.       History  Past Medical History:   Diagnosis Date     Anterior neck pain      Diverticula, colon for 6 years    no flare up     Other anaphylactic reaction March 2005    Diflucan, throat and laryngeal swelling, dramatic, relieved with Benadryl and Medrol dosepak       Past Surgical History:   Procedure Laterality Date     COLONOSCOPY N/A 4/26/2023    Procedure: COLONOSCOPY;  Surgeon: Leventhal, Thomas Michael, MD;  Location: [facility] OR     [facility] NONSPECIFIC  PROCEDURE      tonsillar abcess drained       Family History   Problem Relation Age of Onset     Cancer Father         stomach     Breast Cancer Paternal Grandmother         dx'd age 40,  of uterine CA       Hypertension Paternal Grandfather      Cancer Paternal Aunt         Breast CA dx'd age 53, BRCA 1 positive     Glaucoma No family hx of      Macular Degeneration No family hx of        Social History     Tobacco Use     Smoking status: Never     Smokeless tobacco: Never   Substance Use Topics     Alcohol use: No        Objective  BP (!) 142/88 (BP Location: Right arm, Patient Position: Sitting, Cuff Size: Adult Regular)   Pulse 86   Wt 74.8 kg (165 lb)   LMP 2023   SpO2 99%   BMI 27.46 kg/m    Vitals taken by Sukhdev Parsons MD    Physical Exam  Constitutional:       General: She is not in acute distress.     Appearance: She is not ill-appearing or toxic-appearing.   Pulmonary:      Effort: Pulmonary effort is normal.   Musculoskeletal:      Comments: Right thumb with mild edema and erythema at DIP joint of finger. No ulcerations. No tenderness when pushing tip of thumb towards wrist.    Neurological:      Mental Status: She is alert.   Psychiatric:         Mood and Affect: Mood normal.         Thought Content: Thought content normal.         15 minutes spent on the date of the encounter doing chart review, history and exam, documentation and further activities per the note.      Return if symptoms worsen or fail to improve.      Sukhdev Parsons MD  [facility] INTERNAL MEDICINE [place]

## 2023-09-03 ENCOUNTER — HEALTH MAINTENANCE LETTER (OUTPATIENT)
Age: 48
End: 2023-09-03

## 2023-10-23 ENCOUNTER — TELEPHONE (OUTPATIENT)
Dept: OPHTHALMOLOGY | Facility: CLINIC | Age: 48
End: 2023-10-23

## 2023-10-23 NOTE — TELEPHONE ENCOUNTER
LVM for patient confirming appointment on 10/26/23. Provided [facility] number for any scheduling conflicts.

## 2023-10-26 ENCOUNTER — OFFICE VISIT (OUTPATIENT)
Dept: OPHTHALMOLOGY | Facility: CLINIC | Age: 48
End: 2023-10-26
Payer: COMMERCIAL

## 2023-10-26 ENCOUNTER — OFFICE VISIT (OUTPATIENT)
Dept: OBGYN | Facility: CLINIC | Age: 48
End: 2023-10-26
Payer: COMMERCIAL

## 2023-10-26 DIAGNOSIS — N95.8 GENITOURINARY SYNDROME OF MENOPAUSE: ICD-10-CM

## 2023-10-26 DIAGNOSIS — H52.4 PRESBYOPIA OF BOTH EYES: Primary | ICD-10-CM

## 2023-10-26 DIAGNOSIS — Z12.4 SCREENING FOR MALIGNANT NEOPLASM OF CERVIX: ICD-10-CM

## 2023-10-26 DIAGNOSIS — Z01.411 ENCOUNTER FOR GYNECOLOGICAL EXAMINATION WITH ABNORMAL FINDING: Primary | ICD-10-CM

## 2023-10-26 DIAGNOSIS — N84.1 CERVICAL POLYP: ICD-10-CM

## 2023-10-26 PROCEDURE — 92004 COMPRE OPH EXAM NEW PT 1/>: CPT | Performed by: OPTOMETRIST

## 2023-10-26 PROCEDURE — 99386 PREV VISIT NEW AGE 40-64: CPT | Performed by: OBSTETRICS & GYNECOLOGY

## 2023-10-26 PROCEDURE — 87624 HPV HI-RISK TYP POOLED RSLT: CPT | Performed by: OBSTETRICS & GYNECOLOGY

## 2023-10-26 PROCEDURE — G0145 SCR C/V CYTO,THINLAYER,RESCR: HCPCS | Performed by: OBSTETRICS & GYNECOLOGY

## 2023-10-26 PROCEDURE — 92015 DETERMINE REFRACTIVE STATE: CPT | Performed by: OPTOMETRIST

## 2023-10-26 PROCEDURE — 88305 TISSUE EXAM BY PATHOLOGIST: CPT | Performed by: PATHOLOGY

## 2023-10-26 PROCEDURE — 99213 OFFICE O/P EST LOW 20 MIN: CPT | Mod: 25 | Performed by: OBSTETRICS & GYNECOLOGY

## 2023-10-26 RX ORDER — ESTRADIOL 0.1 MG/G
CREAM VAGINAL
Qty: 42.5 G | Refills: 1 | Status: SHIPPED | OUTPATIENT
Start: 2023-10-26 | End: 2024-10-24

## 2023-10-26 ASSESSMENT — CONF VISUAL FIELD
OS_INFERIOR_NASAL_RESTRICTION: 0
OS_NORMAL: 1
OD_SUPERIOR_TEMPORAL_RESTRICTION: 0
OS_SUPERIOR_NASAL_RESTRICTION: 0
OD_NORMAL: 1
OD_SUPERIOR_NASAL_RESTRICTION: 0
OD_INFERIOR_TEMPORAL_RESTRICTION: 0
OS_SUPERIOR_TEMPORAL_RESTRICTION: 0
OS_INFERIOR_TEMPORAL_RESTRICTION: 0
METHOD: COUNTING FINGERS
OD_INFERIOR_NASAL_RESTRICTION: 0

## 2023-10-26 ASSESSMENT — REFRACTION_MANIFEST
OS_SPHERE: PLANO
OD_AXIS: 085
OD_ADD: +1.75
OD_SPHERE: PLANO
OS_AXIS: 060
OS_CYLINDER: +0.25
OD_CYLINDER: +0.50
OS_ADD: +1.75

## 2023-10-26 ASSESSMENT — SLIT LAMP EXAM - LIDS
COMMENTS: NORMAL
COMMENTS: NORMAL

## 2023-10-26 ASSESSMENT — VISUAL ACUITY
OD_SC+: -1
OD_SC: J16
METHOD: SNELLEN - LINEAR
OD_SC: 20/25
OS_SC: J16
OS_SC: 20/20
OD_PH_SC+: -1
OD_CC+: -1
OD_PH_SC: 20/20

## 2023-10-26 ASSESSMENT — CUP TO DISC RATIO
OS_RATIO: 0.2
OD_RATIO: 0.2

## 2023-10-26 ASSESSMENT — EXTERNAL EXAM - LEFT EYE: OS_EXAM: NORMAL

## 2023-10-26 ASSESSMENT — EXTERNAL EXAM - RIGHT EYE: OD_EXAM: NORMAL

## 2023-10-26 ASSESSMENT — TONOMETRY
IOP_METHOD: TONOPEN
OD_IOP_MMHG: 19
OS_IOP_MMHG: 16

## 2023-10-26 NOTE — NURSING NOTE
Chief Complaints and History of Present Illnesses   Patient presents with    COMPREHENSIVE EYE EXAM     Comprehensive exam     Chief Complaint(s) and History of Present Illness(es)       COMPREHENSIVE EYE EXAM              Laterality: both eyes    Associated symptoms: Negative for dryness, eye pain and discharge    Treatments tried: no treatments    Pain scale: 0/10    Comments: Comprehensive exam              Comments    Using just OTC readers. 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   Would like an a glasses Rx possibly to wear full time to prevent the on and off needs with OTC readers.     Lorena Harrell, COT COT 7:37 AM October 26, 2023

## 2023-10-26 NOTE — PROGRESS NOTES
Lisa is a 48 year old  female who presents for annual exam.     Menses are irregular and normal, crampy, and irregular lasting  5-8  days.  Menses flow: normal, light, and heavy.  Patient's last menstrual period was 10/13/2023.. Using none for contraception.  She is not currently considering pregnancy.  Besides routine health maintenance,  she would like to discuss perimenopausal .  Periods have started to get more irregular. Went up to 7 weeks. Some periods heavy, some light. Sometimes crampy, some cramping in between periods.   Gained 10 pounds in about 6 weeks. Had a very stressful year. Now has dropped 8 of those pounds.   Has family history of BRCA. Has not gotten testing as she is worried about having a pre-existing condition.   Having significant dryness and pain with intercourse. Making her avoid it.   GYNECOLOGIC HISTORY:  Menarche: 11  Lisa is sexually active with 1male partner(s) and is currently in monogamous relationship.    History sexually transmitted infections:Genital warts  STI testing offered?  Declined  ERIC exposure: No  History of abnormal Pap smear: YES - updated in Problem List and Health Maintenance accordingly  Family history of breast CA: Yes (Please explain): pgmo  Family history of uterine/ovarian CA: Yes (Please explain): pgmo    Family history of colon CA: No    HEALTH MAINTENANCE:  Cholesterol: (  Cholesterol   Date Value Ref Range Status   10/12/2017 204 (H) <200 mg/dL Final     Comment:     Desirable:       <200 mg/dl   2014 196 <200 mg/dL Final     Comment:     LDL Cholesterol is the primary guide to therapy.   The NCEP recommends further evaluation of: patients with cholesterol greater   than 200 mg/dL if additional risk factors are present, cholesterol greater   than   240 mg/dL, triglycerides greater than 150 mg/dL, or HDL less than 40 mg/dL.      History of abnormal lipids: Yes  Mammo: 2022 . History of abnormal Mammo: No.  Regular Self Breast Exams:  Yes  Calcium/Vitamin D intake: source:  dairy, dietary supplement(s) Adequate? Yes  TSH: (  TSH   Date Value Ref Range Status   2007 0.96 0.4 - 5.0 mU/L Final    )  Pap; (  Lab Results   Component Value Date    PAP NIL 2019    PAP NIL 2014    PAP NIL 2010    )    HISTORY:  OB History    Para Term  AB Living   2 2 2 0 0 2   SAB IAB Ectopic Multiple Live Births   0 0 0 0 2      # Outcome Date GA Lbr Jose Raul/2nd Weight Sex Delivery Anes PTL Lv   2 Term 12 39w5d 04:20 / 00:11 3.232 kg (7 lb 2 oz) M Vag-Spont Local N SVETLANA      Name: GIOVANA ALMEIDA      Apgar1: 9  Apgar5: 9   1 Term 08 40w3d 09:00 3.147 kg (6 lb 15 oz) M  None N SVETLANA      Birth Comments: pushed 3 hours - AFP      Name: Oscar     Past Medical History:   Diagnosis Date    Anterior neck pain     Diverticula, colon for 6 years    no flare up    Other anaphylactic reaction 2005    Diflucan, throat and laryngeal swelling, dramatic, relieved with Benadryl and Medrol dosepak     Past Surgical History:   Procedure Laterality Date    COLONOSCOPY N/A 2023    Procedure: COLONOSCOPY;  Surgeon: Leventhal, Thomas Michael, MD;  Location: [facility] OR    [facility] NONSPECIFIC PROCEDURE      tonsillar abcess drained     Family History   Problem Relation Age of Onset    Lung Cancer Mother         smoker    Cancer Father         stomach    Breast Cancer Paternal Grandmother         dx'd age 40,  of uterine CA      Hypertension Paternal Grandfather     Cancer Paternal Aunt         Breast CA dx'd age 53, BRCA 1 positive    Glaucoma No family hx of     Macular Degeneration No family hx of      Social History     Socioeconomic History    Marital status:      Spouse name: None    Number of children: None    Years of education: None    Highest education level: None   Tobacco Use    Smoking status: Never    Smokeless tobacco: Never   Vaping Use    Vaping Use: Never used   Substance and Sexual Activity    Alcohol  use: No    Drug use: No    Sexual activity: Yes     Partners: Male   Social History Narrative    Caffeine intake/servings daily - 2    Calcium intake/servings daily - 1-2    Exercise 3-4 times weekly - describe cardio and weights    Sunscreen used - Yes    Seatbelts used - Yes    Guns stored in the home - No    Self Breast Exam - Yes    Pap test up to date -  Yes 3/2005    Eye exam up to date -  No    Dental exam up to date -  Yes    DEXA scan up to date -  Not Applicable    Flex Sig/Colonoscopy up to date -  Not Applicable    Mammography up to date -  Not Applicable    Immunizations reviewed and up to date - Yes    Abuse: Current or Past (Physical, Sexual or Emotional) - No    Do you feel safe in your environment - Yes    Do you cope well with stress - Yes    Do you suffer from insomnia - No    Last updated by: Ricarda Arevalo  3/17/2005       Current Outpatient Medications:     bisacodyl (DULCOLAX) 5 MG EC tablet, Take 2 tablets at 3 pm the day before your procedure. If your procedure is before 11 am, take 2 additional tablets at 11 pm. If your procedure is after 11 am, take 2 additional tablets at 6 am. For additional instructions refer to your colonoscopy prep instructions., Disp: 4 tablet, Rfl: 0    bisacodyl (DULCOLAX) 5 MG EC tablet, Take as directed. One day before exam take 2 tablets at 3 PM. Take 2 tablets at 11 PM., Disp: 4 tablet, Rfl: 0    cephALEXin (KEFLEX) 500 MG capsule, Take 2 capsules (1,000 mg) by mouth 3 times daily, Disp: 18 capsule, Rfl: 0    estradiol (ESTRACE) 0.1 MG/GM vaginal cream, Place 0.5 g vaginally daily for 14 days, THEN 0.5 g twice a week for 350 days., Disp: 42.5 g, Rfl: 1    Multiple Vitamin (MULTIVITAMINS PO), Take 1 tablet by mouth daily, Disp: , Rfl:     polyethylene glycol (GOLYTELY) 236 g suspension, The night before the exam at 6 pm drink an 8-ounce glass every 15 minutes until the jug is half empty. If you arrive before 11 AM: Drink the other half of the Cloutex jug at 11 PM  night before procedure. If you arrive after 11 AM: Drink the other half of the Golytely jug at 6 AM day of procedure. For additional instructions refer to your colonoscopy prep instructions., Disp: 4000 mL, Rfl: 0    polyethylene glycol (GOLYTELY) 236 g suspension, Take as directed. One day before exam fill the jug with water. Cover and shake until well mixed. At 6 PM start drinking an 8oz glass of mixture every 15 minutes until jug is 1/2 empty. Store remainder in the refrigerator.  At 11 PM Start drinking the other half of the Golytely jug. Drink one 8-ounce glass every 15 minutes until the jug is empty., Disp: 4000 mL, Rfl: 0    triamcinolone (KENALOG) 0.1 % external cream, Apply topically 2 times daily, Disp: 30 g, Rfl: 1     Allergies   Allergen Reactions    Diflucan [Fluconazole] Anaphylaxis    Ortho Tri-Cyclen, [Norgestimate-Eth Estradiol] Other (See Comments)     Very high blood pressure       Past medical, surgical, social and family history were reviewed and updated in EPIC.    ROS:   C:     NEGATIVE for fever, chills, change in weight  I:       NEGATIVE for worrisome rashes, moles or lesions  E:     NEGATIVE for vision changes or irritation  E/M: NEGATIVE for ear, mouth and throat problems  R:     NEGATIVE for significant cough or SOB  CV:   NEGATIVE for chest pain, palpitations or peripheral edema  GI:     NEGATIVE for nausea, abdominal pain, heartburn, or change in bowel habits  :   NEGATIVE for frequency, dysuria, hematuria, vaginal discharge, or irregular bleeding  M:     NEGATIVE for significant arthralgias or myalgia  N:      NEGATIVE for weakness, dizziness or paresthesias  E:      NEGATIVE for temperature intolerance, skin/hair changes  P:      NEGATIVE for changes in mood or affect.    EXAM:  LMP 10/13/2023    BMI: There is no height or weight on file to calculate BMI.  Constitutional: healthy, alert and no distress     Breast: Breasts reveal mild symmetric fibrocystic densities, but there are  no dominant, discrete, fixed or suspicious masses found.  Gastrointestinal: Abdomen soft, non-tender, non-distended. No masses, organomegaly.  :  Vulva:  No external lesions, normal female hair distribution, no inguinal adenopathy.    Urethra:  Midline, non-tender, well supported, no discharge  Vagina:  Moist, pale pink, no abnormal discharge, no lesions  Uterus:  Normal size, large cervical polyp present , non-tender, freely mobile  Ovaries:  No masses appreciated, non-tender, mobile  Rectal Exam: deferred  Musculoskeletal: extremities normal  Skin: no suspicious lesions or rashes  Psychiatric: Affect appropriate, cooperative,mentation appears normal.     COUNSELING:   Reviewed preventive health counseling, as reflected in patient instructions       Regular exercise       Healthy diet/nutrition       Osteoporosis prevention/bone health       (Olga)menopause management   reports that she has never smoked. She has never used smokeless tobacco.    There is no height or weight on file to calculate BMI.    FRAX Risk Assessment    ASSESSMENT:  48 year old female with satisfactory annual exam  (Z01.411) Encounter for gynecological examination with abnormal finding  (primary encounter diagnosis)  Comment:   Plan: Pap today.   Discussed perimenopause.   Discussed in early perimenopause. Once going more than 2 months without periods will be in late perimenopause and may expect final menstrual period in 1-3 years. Discussed management perimenopausal bleeding. Could do OCPs, Mirena IUD (minimal systemic hormone). Endometrial ablation, or TXA. Wants to think about options. Recommend genetic counseling and possible testing for BRCA 1. Discussed even if positive for BRCA1, ok for low dose COCs prior to menopause, with some benefits in ovarian cancer risk reduction.     (N84.1) Cervical polyp  Comment:   Plan: Surgical Pathology Exam        Procedure: Cervical polypectomy  After informed consent was obtained, the patient was  placed in lithotomy.   A speculum was placed.  The cervical polyp was visualized. It was grasped with a ring forceps and removed with torsion.  The base was not curetted with a Kevorkian.  Silver nitrate was necessary for hemostasis.   The patient tolerated the procedure well.      (Z12.4) Screening for malignant neoplasm of cervix  Comment:   Plan: Pap screen with HPV - recommended age 30 - 65         years, HPV Hold (Lab Only)            (N95.8) Genitourinary syndrome of menopause  Comment:   Plan: estradiol (ESTRACE) 0.1 MG/GM vaginal cream        Given symptoms recommend treatment. Discussed minimal systemic absorption and no demonstrated increased risk of breast cancer.

## 2023-10-26 NOTE — PROGRESS NOTES
History  HPI       COMPREHENSIVE EYE EXAM    In both eyes.  Associated symptoms include Negative for dryness, eye pain and discharge.  Treatments tried include no treatments.  Pain was noted as 0/10. Additional comments: Comprehensive exam             Comments    Using just OTC readers. 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   Would like a glasses prescription possibly to wear full time to prevent the on and off needs with OTC readers.     Lorenasa Harrell, COT COT 7:37 AM October 26, 2023               Last edited by Franko Callahan, OD on 10/26/2023  7:52 AM.          Assessment/Plan  (H52.4) Presbyopia of both eyes  (primary encounter diagnosis)  Comment: Presbyopia both eyes, good distance vision, first PAL prescription   Plan: REFRACTION         Educated patient on condition and clinical findings. Dispensed spectacle prescription for full time wear. Monitor annually.    Ocular health normal on examination today.    Return to clinic in 1 year for comprehensive eye exam.    Complete documentation of historical and exam elements from today's encounter can  be found in the full encounter summary report (not reduplicated in this progress  note). I personally obtained the chief complaint(s) and history of present illness. I  confirmed and edited as necessary the review of systems, past medical/surgical  history, family history, social history, and examination findings as documented by  others; and I examined the patient myself. I personally reviewed the relevant tests,  images, and reports as documented above. I formulated and edited as necessary the  assessment and plan and discussed the findings and management plan with the  patient and family.    Franko Callahan, OD, [facility]

## 2023-10-30 LAB
PATH REPORT.COMMENTS IMP SPEC: NORMAL
PATH REPORT.COMMENTS IMP SPEC: NORMAL
PATH REPORT.FINAL DX SPEC: NORMAL
PATH REPORT.GROSS SPEC: NORMAL
PATH REPORT.MICROSCOPIC SPEC OTHER STN: NORMAL
PATH REPORT.RELEVANT HX SPEC: NORMAL
PHOTO IMAGE: NORMAL

## 2023-10-31 LAB
BKR LAB AP GYN ADEQUACY: NORMAL
BKR LAB AP GYN INTERPRETATION: NORMAL
BKR LAB AP HPV REFLEX: NORMAL
BKR LAB AP LMP: NORMAL
BKR LAB AP PREVIOUS ABNORMAL: NORMAL
PATH REPORT.COMMENTS IMP SPEC: NORMAL
PATH REPORT.COMMENTS IMP SPEC: NORMAL
PATH REPORT.RELEVANT HX SPEC: NORMAL

## 2023-11-01 LAB
HUMAN PAPILLOMA VIRUS 16 DNA: NEGATIVE
HUMAN PAPILLOMA VIRUS 18 DNA: NEGATIVE
HUMAN PAPILLOMA VIRUS FINAL DIAGNOSIS: NORMAL
HUMAN PAPILLOMA VIRUS OTHER HR: NEGATIVE

## 2023-11-12 ENCOUNTER — HEALTH MAINTENANCE LETTER (OUTPATIENT)
Age: 48
End: 2023-11-12

## 2023-12-02 ENCOUNTER — OFFICE VISIT (OUTPATIENT)
Dept: INTERNAL MEDICINE | Facility: CLINIC | Age: 48
End: 2023-12-02
Payer: COMMERCIAL

## 2023-12-02 VITALS
DIASTOLIC BLOOD PRESSURE: 79 MMHG | OXYGEN SATURATION: 99 % | HEART RATE: 78 BPM | BODY MASS INDEX: 26.66 KG/M2 | SYSTOLIC BLOOD PRESSURE: 132 MMHG | HEIGHT: 65 IN | WEIGHT: 160 LBS

## 2023-12-02 DIAGNOSIS — Z12.31 ENCOUNTER FOR SCREENING MAMMOGRAM FOR BREAST CANCER: Primary | ICD-10-CM

## 2023-12-02 DIAGNOSIS — D22.9 ATYPICAL MOLE: ICD-10-CM

## 2023-12-02 DIAGNOSIS — Z00.00 HEALTHCARE MAINTENANCE: ICD-10-CM

## 2023-12-02 DIAGNOSIS — R03.0 ELEVATED BP WITHOUT DIAGNOSIS OF HYPERTENSION: ICD-10-CM

## 2023-12-02 DIAGNOSIS — R53.83 OTHER FATIGUE: ICD-10-CM

## 2023-12-02 PROCEDURE — 99396 PREV VISIT EST AGE 40-64: CPT | Performed by: INTERNAL MEDICINE

## 2023-12-02 ASSESSMENT — PAIN SCALES - GENERAL: PAINLEVEL: NO PAIN (0)

## 2023-12-02 NOTE — PROGRESS NOTES
SUBJECTIVE:  Lisa Colbert is a 48 year old female presents for   Chief Complaint   Patient presents with    Physical     Here for annual visit.      Lisa is doing well. Working out 6d per week. ROS negative as below. Some work stressors. Fatigue present - 6-7 hrs sleep/night. Parenting teenage boys- all going well. Safe at home.     Medications and allergies were reviewed by me today.   Current Outpatient Medications   Medication    bisacodyl (DULCOLAX) 5 MG EC tablet    bisacodyl (DULCOLAX) 5 MG EC tablet    cephALEXin (KEFLEX) 500 MG capsule    estradiol (ESTRACE) 0.1 MG/GM vaginal cream    Multiple Vitamin (MULTIVITAMINS PO)    polyethylene glycol (GOLYTELY) 236 g suspension    polyethylene glycol (GOLYTELY) 236 g suspension    triamcinolone (KENALOG) 0.1 % external cream     No current facility-administered medications for this visit.     PMH  H/O diverticulosis     Family History  Paternal aunt with BRCA-1--> not testing related to concerns over impact on insurance  Father  of stomach cancer over 20 years ago  Mother went through menopause at 44,  of lung cancer last year     SH    Healthy kids  Works at [employer]  No tobacco  Minimal alcohol    Review Of Systems    Constitutional: no fevers, chills, night sweats or unintentional weight change   Eyes: no vision change, diplopia or red eyes   Ears, Nose, Mouth, Throat: no tinnitus or hearing change, no epistaxis or nasal discharge, no oral lesions, throat clear   Cardiovascular: no chest pain, palpitations, or pain with walking, no orthopnea or PND   Respiratory: no dyspnea, cough, shortness of breath or wheezing   GI: no nausea, vomiting, diarrhea or constipation, no abdominal pain   : no change in urine, no dysuria or hematuria, no sexual dysfunction   Musculoskeletal: no joint or muscle pain or swelling   Integumentary: no concerning lesions or moles   Neuro: no loss of strength or sensation, no numbness or tingling, no  "tremor, no dizziness, no headache   Endo: no polyuria or polydipsia, no temperature intolerance   Heme/Lymph: no concerning bumps, no bleeding problems   Allergy: no environmental allergies   Psych: no depression or anxiety, no sleep problems      PHYSICAL EXAM:  /79 (BP Location: Right arm, Patient Position: Sitting, Cuff Size: Adult Regular)   Pulse 78   Ht 1.651 m (5' 5\")   Wt 72.6 kg (160 lb)   LMP 11/21/2023 (Exact Date)   SpO2 99%   BMI 26.63 kg/m        Constitutional: no distress, comfortable, pleasant   Eyes: anicteric, normal extra-ocular movements   Ears, Nose and Throat: tympanic membranes clear, nose clear and free of lesions, throat clear, neck supple with full range of motion, no thyromegaly.   Cardiovascular: regular rate and rhythm, normal S1 and S2, no murmurs, rubs or gallops, peripheral pulses full and symmetric   Respiratory: clear to auscultation, no wheezes or crackles, normal breath sounds   Gastrointestinal: positive bowel sounds, nontender, no hepatosplenomegaly, no masses   Musculoskeletal: full range of motion, no edema   Skin: no concerning lesions, no jaundice   Neurological: cranial nerves intact, normal strength and sensation, reflexes at patella and biceps normal, normal gait, no tremor   Psychological: appropriate mood   Lymphatic: no cervical, axillary or inguinal lymphadenopathy      ASSESSMENT/PLAN:  1) HCM. Pap UTD. Mammo ordered. Immunizations UTD. Diet/exercise regimens excellent. Colonoscopy due in 10 yrs. Lipid panel ordered.  2) Diverticulosis. Stable on high residue diet. No symptoms of diverticulitis.   3) Skin Nevi. Fair complexion and higher risk of melanoma. Derm eval.   4) Elevated BP without HTN diagnosis. She will check home BPs as target is <120/80. If consistent elevation would do 24 hr monitor. BMP ordered.  5) Fatigue. Likely related to amount of sleep and some work stress. Check TSH.   6) Olga-menopausal symptoms. Saw Gyn and considering options. "   7) Aunt with BRCA-1 +. She will consider testing at age 49. Has worried about insurance concerns up til now. Discussed at length.    30 minutes spent on the date of the encounter performing chart review, history and exam, documentation and further activities as noted above.    Bernardino Nowak MD  [facility]  [facility]

## 2024-03-04 ENCOUNTER — ANCILLARY PROCEDURE (OUTPATIENT)
Dept: MAMMOGRAPHY | Facility: CLINIC | Age: 49
End: 2024-03-04
Attending: INTERNAL MEDICINE
Payer: COMMERCIAL

## 2024-03-04 DIAGNOSIS — Z12.31 ENCOUNTER FOR SCREENING MAMMOGRAM FOR BREAST CANCER: ICD-10-CM

## 2024-03-04 PROCEDURE — 77063 BREAST TOMOSYNTHESIS BI: CPT | Performed by: RADIOLOGY

## 2024-03-04 PROCEDURE — 77067 SCR MAMMO BI INCL CAD: CPT | Performed by: RADIOLOGY

## 2025-01-09 DIAGNOSIS — Z12.31 ENCOUNTER FOR SCREENING MAMMOGRAM FOR BREAST CANCER: Primary | ICD-10-CM

## 2025-02-01 ENCOUNTER — HEALTH MAINTENANCE LETTER (OUTPATIENT)
Age: 50
End: 2025-02-01

## 2025-02-06 ENCOUNTER — MYC MEDICAL ADVICE (OUTPATIENT)
Dept: INTERNAL MEDICINE | Facility: CLINIC | Age: 50
End: 2025-02-06
Payer: COMMERCIAL

## 2025-02-13 ENCOUNTER — OFFICE VISIT (OUTPATIENT)
Dept: OPHTHALMOLOGY | Facility: CLINIC | Age: 50
End: 2025-02-13
Payer: COMMERCIAL

## 2025-02-13 ENCOUNTER — LAB (OUTPATIENT)
Dept: LAB | Facility: CLINIC | Age: 50
End: 2025-02-13
Payer: COMMERCIAL

## 2025-02-13 DIAGNOSIS — Z00.00 HEALTHCARE MAINTENANCE: ICD-10-CM

## 2025-02-13 DIAGNOSIS — H52.4 PRESBYOPIA OF BOTH EYES: Primary | ICD-10-CM

## 2025-02-13 DIAGNOSIS — R53.83 OTHER FATIGUE: ICD-10-CM

## 2025-02-13 DIAGNOSIS — R03.0 ELEVATED BP WITHOUT DIAGNOSIS OF HYPERTENSION: ICD-10-CM

## 2025-02-13 ASSESSMENT — CUP TO DISC RATIO
OS_RATIO: 0.2
OD_RATIO: 0.2

## 2025-02-13 ASSESSMENT — REFRACTION_MANIFEST
OD_AXIS: 056
OS_SPHERE: +0.25
OS_ADD: +2.50
OD_SPHERE: +0.25
OS_AXIS: 056
OD_ADD: +2.50
OD_CYLINDER: +0.75
OS_CYLINDER: +0.50

## 2025-02-13 ASSESSMENT — VISUAL ACUITY
OD_SC: 20/20
METHOD: SNELLEN - LINEAR
OS_SC: 20/20
OD_SC+: -1

## 2025-02-13 ASSESSMENT — CONF VISUAL FIELD
OD_INFERIOR_TEMPORAL_RESTRICTION: 0
OS_INFERIOR_TEMPORAL_RESTRICTION: 0
OS_SUPERIOR_TEMPORAL_RESTRICTION: 0
OS_NORMAL: 1
OD_NORMAL: 1
OS_INFERIOR_NASAL_RESTRICTION: 0
OD_SUPERIOR_TEMPORAL_RESTRICTION: 0
METHOD: COUNTING FINGERS
OD_SUPERIOR_NASAL_RESTRICTION: 0
OS_SUPERIOR_NASAL_RESTRICTION: 0
OD_INFERIOR_NASAL_RESTRICTION: 0

## 2025-02-13 ASSESSMENT — EXTERNAL EXAM - LEFT EYE: OS_EXAM: NORMAL

## 2025-02-13 ASSESSMENT — TONOMETRY
IOP_METHOD: TONOPEN
OD_IOP_MMHG: 14
OS_IOP_MMHG: 18

## 2025-02-13 ASSESSMENT — EXTERNAL EXAM - RIGHT EYE: OD_EXAM: NORMAL

## 2025-02-13 ASSESSMENT — SLIT LAMP EXAM - LIDS
COMMENTS: NORMAL
COMMENTS: NORMAL

## 2025-02-13 NOTE — PROGRESS NOTES
History  HPI       COMPREHENSIVE EYE EXAM    Associated symptoms include Negative for dryness, eye pain, flashes and floaters.  Treatments tried include no treatments.  Pain was noted as 0/10. Additional comments: Here for a Comprehensive eye exam              Comments    Pt is wearing no distance correction, only +2.00 readers for computer/near.   She tried PALs for a month and they made her dizzy.   Averaging 8-10hr of screen time daily.  Denies eye pain or discomfort.   Ocular Meds: Billy Shepherd 2:29 PM February 13, 2025            Last edited by Spencer Shepherd on 2/13/2025  2:30 PM.          Assessment/Plan  (H52.4) Presbyopia of both eyes  (primary encounter diagnosis)  Comment: Presbyopia both eyes, good distance vision, first PAL prescription   Plan: REFRACTION              Educated patient on condition and clinical findings. Dispensed spectacle prescription for as-needed wear or continue use of reading glasses. Monitor annually.     Ocular health normal on examination today.     Return to clinic in 1 year for comprehensive eye exam.    Abbey Dixon, Optometry Student    I have reviewed and agree with the above plan as written.    Teaching Statement:    Complete documentation of historical and exam elements from today's encounter can  be found in the full encounter summary report (not reduplicated in this progress  note). I personally obtained the chief complaint(s) and history of present illness. I  confirmed and edited as necessary the review of systems, past medical/surgical  history, family history, social history, and examination findings as documented by  others; and I examined the patient myself. I personally reviewed the relevant tests,  images, and reports as documented above. I formulated and edited as necessary the  assessment and plan and discussed the findings and management plan with the  patient and family.    Franko Callahan, OD, FAAO

## 2025-02-13 NOTE — NURSING NOTE
Chief Complaints and History of Present Illnesses   Patient presents with    COMPREHENSIVE EYE EXAM     Here for a Comprehensive eye exam      Chief Complaint(s) and History of Present Illness(es)       COMPREHENSIVE EYE EXAM              Associated symptoms: Negative for dryness, eye pain, flashes and floaters    Treatments tried: no treatments    Pain scale: 0/10    Comments: Here for a Comprehensive eye exam               Comments    Pt is wearing no distance correction, only +2.00 readers for computer/near.   She tried PALs for a month and they made her dizzy.   Averaging 8-10hr of screen time daily.  Denies eye pain or discomfort.   Ocular Meds: None    Spencer Shepherd 2:29 PM February 13, 2025

## 2025-02-17 ENCOUNTER — OFFICE VISIT (OUTPATIENT)
Dept: INTERNAL MEDICINE | Facility: CLINIC | Age: 50
End: 2025-02-17
Payer: COMMERCIAL

## 2025-02-17 VITALS
DIASTOLIC BLOOD PRESSURE: 90 MMHG | SYSTOLIC BLOOD PRESSURE: 143 MMHG | OXYGEN SATURATION: 98 % | RESPIRATION RATE: 18 BRPM | TEMPERATURE: 97.8 F | WEIGHT: 172.5 LBS | BODY MASS INDEX: 28.74 KG/M2 | HEIGHT: 65 IN | HEART RATE: 75 BPM

## 2025-02-17 DIAGNOSIS — R03.0 ELEVATED BP WITHOUT DIAGNOSIS OF HYPERTENSION: ICD-10-CM

## 2025-02-17 DIAGNOSIS — K57.30 DIVERTICULOSIS OF LARGE INTESTINE WITHOUT HEMORRHAGE: ICD-10-CM

## 2025-02-17 DIAGNOSIS — Z84.81 FAMILY HISTORY OF BRCA1 GENE POSITIVE: ICD-10-CM

## 2025-02-17 DIAGNOSIS — E78.5 HYPERLIPIDEMIA LDL GOAL <100: ICD-10-CM

## 2025-02-17 DIAGNOSIS — Z13.1 SCREENING FOR DIABETES MELLITUS: ICD-10-CM

## 2025-02-17 DIAGNOSIS — Z78.0 MENOPAUSE: ICD-10-CM

## 2025-02-17 DIAGNOSIS — Z00.00 HEALTHCARE MAINTENANCE: Primary | ICD-10-CM

## 2025-02-17 DIAGNOSIS — Z11.59 NEED FOR HEPATITIS C SCREENING TEST: ICD-10-CM

## 2025-02-17 LAB — FSH SERPL IRP2-ACNC: 8.1 MIU/ML

## 2025-02-17 PROCEDURE — 99396 PREV VISIT EST AGE 40-64: CPT | Mod: 25 | Performed by: INTERNAL MEDICINE

## 2025-02-17 PROCEDURE — 90471 IMMUNIZATION ADMIN: CPT | Performed by: INTERNAL MEDICINE

## 2025-02-17 PROCEDURE — 90677 PCV20 VACCINE IM: CPT | Performed by: INTERNAL MEDICINE

## 2025-02-17 RX ORDER — MULTIVIT WITH MINERALS/LUTEIN
250 TABLET ORAL DAILY
COMMUNITY

## 2025-02-17 RX ORDER — VITAMIN B COMPLEX
TABLET ORAL DAILY
COMMUNITY

## 2025-02-17 SDOH — HEALTH STABILITY: PHYSICAL HEALTH: ON AVERAGE, HOW MANY MINUTES DO YOU ENGAGE IN EXERCISE AT THIS LEVEL?: 30 MIN

## 2025-02-17 SDOH — HEALTH STABILITY: PHYSICAL HEALTH: ON AVERAGE, HOW MANY DAYS PER WEEK DO YOU ENGAGE IN MODERATE TO STRENUOUS EXERCISE (LIKE A BRISK WALK)?: 7 DAYS

## 2025-02-17 ASSESSMENT — SOCIAL DETERMINANTS OF HEALTH (SDOH): HOW OFTEN DO YOU GET TOGETHER WITH FRIENDS OR RELATIVES?: MORE THAN THREE TIMES A WEEK

## 2025-02-17 NOTE — PROGRESS NOTES
"Preventive Care Visit  [facility]  Geovani Nowak MD, Internal Medicine - Pediatrics  2025      Assessment & Plan     Healthcare maintenance  Excellent exercise habits (cardio and strength training 5x/week). Diet discussed and she'll track caloric intake and exercise cals burned using MyFitnessPal given overweight (BMI 28.6) despite healthy habits. Cancer screening UTD but need BRCA testing given family history (PGM with breast cancer, father  of stomach cancer, paternal aunt with BRCA1). She'll do 23[employer] to enable personal control of the data and she'll share results with me. If positive would need advanced screening (ie, breast MRI and ovarian ultrasound/). Routine eye, dental care UTD.     Elevated BP without diagnosis of hypertension  Home readings all in normal/ideal range. No meds at this time.     Diverticulosis of large intestine without hemorrhage  Recent flare requiring abx. No fiber supplements so added psyllium recommendations daily titrate up to 2 tbsp/d.     Need for hepatitis C screening test  Added to specimen in lab.  - Hepatitis C Screen Reflex to HCV RNA Quant and Genotype; Future    Menopause  No period since December and no hot flushes- is certain she's not pregnant. Check FSH.  - Follicle stimulating hormone; Future    Hyperlipidemia LDL goal <100  10 yr risk of 2% so unless CAC is high would not start a statin. Diet changes recommended with recheck in 6 mos.   - Lipid panel reflex to direct LDL Fasting; Future    Screening for diabetes mellitus  - **Hemoglobin A1c FUTURE 6mo; Future    30 minutes spent on the date of the encounter performing chart review, history and exam, documentation and further activities as noted above.    Bernardino Nowak MD  Primary Care Center  [facility]        BMI  Estimated body mass index is 28.65 kg/m  as calculated from the following:    Height as of this encounter: 1.653 m (5' 5.06\").    " Weight as of this encounter: 78.2 kg (172 lb 8 oz).     Counseling  Appropriate preventive services were addressed with this patient via screening, questionnaire, or discussion as appropriate for fall prevention, nutrition, physical activity, Tobacco-use cessation, social engagement, weight loss and cognition.  Checklist reviewing preventive services available has been given to the patient.  Reviewed patient's diet, addressing concerns and/or questions.     Return in about 6 months (around 8/17/2025).    Subjective   Lisa is a 49 year old, presenting for the following:  Physical (Pt here for annual physical; discuss weight gain with perimenopause)        2/17/2025     1:11 PM   Additional Questions   Roomed by Michelle [facility]  Lisa is doing well but has noted weight gain despite regular exercise and healthy diet. No period in few months and sure she is not pregnant- no regular hot flushes. Discussed FH of BRCA mutation and cancer cases- recommend testing. She will consider 3rd party vendor options like 23andMe. Discussed elevated cholesterol.      Health Care Directive  Patient does not have a Health Care Directive: Full CODE      2/17/2025   General Health   How would you rate your overall physical health? Good   Feel stress (tense, anxious, or unable to sleep) Only a little   (!) STRESS CONCERN      2/17/2025   Nutrition   Three or more servings of calcium each day? Yes   Diet: Regular (no restrictions)   How many servings of fruit and vegetables per day? (!) 2-3   How many sweetened beverages each day? 0-1         2/17/2025   Exercise   Days per week of moderate/strenous exercise 7 days   Average minutes spent exercising at this level 30 min         2/17/2025   Social Factors   Frequency of gathering with friends or relatives More than three times a week   Worry food won't last until get money to buy more No   Food not last or not have enough money for food? No   Do you have housing? (Housing is  defined as stable permanent housing and does not include staying ouside in a car, in a tent, in an abandoned building, in an overnight shelter, or couch-surfing.) Yes   Are you worried about losing your housing? No   Lack of transportation? No   Unable to get utilities (heat,electricity)? No         2/17/2025   Fall Risk   Fallen 2 or more times in the past year? No   Trouble with walking or balance? No          2/17/2025   Dental   Dentist two times every year? Yes            Today's PHQ-2 Score:       2/17/2025     9:56 AM   PHQ-2 ( 1999 [employer])   Q1: Little interest or pleasure in doing things 0   Q2: Feeling down, depressed or hopeless 0   PHQ-2 Score 0    Q1: Little interest or pleasure in doing things Not at all   Q2: Feeling down, depressed or hopeless Not at all   PHQ-2 Score 0       Patient-reported           2/17/2025   Substance Use   Alcohol more than 3/day or more than 7/wk No   Do you use any other substances recreationally? No     Social History     Tobacco Use    Smoking status: Never    Smokeless tobacco: Never   Vaping Use    Vaping status: Never Used   Substance Use Topics    Alcohol use: No    Drug use: No           3/4/2024   LAST FHS-7 RESULTS   1st degree relative breast or ovarian cancer No   Any relative bilateral breast cancer No   Any male have breast cancer No   Any ONE woman have BOTH breast AND ovarian cancer Yes   Any woman with breast cancer before 50yrs Yes   2 or more relatives with breast AND/OR ovarian cancer Yes   2 or more relatives with breast AND/OR bowel cancer No              2/17/2025   STI Screening   New sexual partner(s) since last STI/HIV test? No         Latest Ref Rng & Units 10/26/2023     4:48 PM 2/11/2019     3:10 PM 2/11/2019     3:01 PM   PAP / HPV   PAP  Negative for Intraepithelial Lesion or Malignancy (NILM)      PAP (Historical)    NIL    HPV 16 DNA Negative Negative  Negative     HPV 18 DNA Negative Negative  Negative     Other HR HPV Negative Negative   "Negative       ASCVD Risk   The 10-year ASCVD risk score (Park MILAN, et al., 2019) is: 2%    Values used to calculate the score:      Age: 49 years      Sex: Female      Is Non- : No      Diabetic: No      Tobacco smoker: No      Systolic Blood Pressure: 143 mmHg      Is BP treated: No      HDL Cholesterol: 53 mg/dL      Total Cholesterol: 231 mg/dL          2/17/2025   Contraception/Family Planning   Questions about contraception or family planning No        Reviewed and updated as needed this visit by Provider                      Review of Systems  Constitutional, HEENT, cardiovascular, pulmonary, gi and gu systems are negative, except as otherwise noted.     Objective    Exam  BP (!) 143/90 (BP Location: Right arm, Patient Position: Sitting, Cuff Size: Adult Large)   Pulse 75   Temp 97.8  F (36.6  C) (Oral)   Resp 18   Ht 1.653 m (5' 5.06\")   Wt 78.2 kg (172 lb 8 oz)   SpO2 98%   BMI 28.65 kg/m     Estimated body mass index is 28.65 kg/m  as calculated from the following:    Height as of this encounter: 1.653 m (5' 5.06\").    Weight as of this encounter: 78.2 kg (172 lb 8 oz).    Physical Exam  GENERAL: alert and no distress  EYES: Eyes grossly normal to inspection, PERRL and conjunctivae and sclerae normal  NECK: no adenopathy, no asymmetry, masses, or scars  RESP: lungs clear to auscultation - no rales, rhonchi or wheezes  CV: regular rate and rhythm, normal S1 S2, no S3 or S4, no murmur, click or rub, no peripheral edema  ABDOMEN: soft, nontender, no hepatosplenomegaly, no masses and bowel sounds normal  MS: no gross musculoskeletal defects noted, no edema  SKIN: no suspicious lesions or rashes  NEURO: Normal strength and tone, mentation intact and speech normal  PSYCH: mentation appears normal, affect normal/bright  LYMPH: no cervical, supraclavicular, axillary, or inguinal adenopathy        Signed Electronically by: Geovani Nowak MD    "

## 2025-03-06 ENCOUNTER — ANCILLARY PROCEDURE (OUTPATIENT)
Dept: MAMMOGRAPHY | Facility: CLINIC | Age: 50
End: 2025-03-06
Attending: INTERNAL MEDICINE
Payer: COMMERCIAL

## 2025-03-06 DIAGNOSIS — Z12.31 ENCOUNTER FOR SCREENING MAMMOGRAM FOR BREAST CANCER: ICD-10-CM

## 2025-03-06 PROCEDURE — 77063 BREAST TOMOSYNTHESIS BI: CPT | Mod: GC | Performed by: RADIOLOGY

## 2025-03-06 PROCEDURE — 77067 SCR MAMMO BI INCL CAD: CPT | Mod: GC | Performed by: RADIOLOGY

## 2025-04-10 ENCOUNTER — OFFICE VISIT (OUTPATIENT)
Dept: OBGYN | Facility: CLINIC | Age: 50
End: 2025-04-10
Payer: COMMERCIAL

## 2025-04-10 VITALS
WEIGHT: 172 LBS | TEMPERATURE: 98.6 F | SYSTOLIC BLOOD PRESSURE: 142 MMHG | HEART RATE: 76 BPM | BODY MASS INDEX: 28.57 KG/M2 | DIASTOLIC BLOOD PRESSURE: 76 MMHG

## 2025-04-10 DIAGNOSIS — Z01.411 ENCOUNTER FOR GYNECOLOGICAL EXAMINATION WITH ABNORMAL FINDING: Primary | ICD-10-CM

## 2025-04-10 NOTE — PROGRESS NOTES
Lisa is a 49 year old  female who presents for annual exam.     Menses are irregular and normal lasting 5 days.  Menses flow: normal.  No LMP recorded. Patient is perimenopausal.. Using none for contraception.  She is not currently considering pregnancy.  Recently went several months (4-5) without a period, then it returned. Aware menopause is in 12 months without menses.   Besides routine health maintenance, she has no other health concerns today . Insurance covers separate annual with PCP and gyn  Didn't consistently use estrace cream.   GYNECOLOGIC HISTORY:  Menarche: 11    Lisa is sexually active with 1 male partner(s) and is currently in monogamous relationship.    History sexually transmitted infections:Genital warts  STI testing offered?  Declined  ERIC exposure: No  History of abnormal Pap smear: YES - reflected in Problem List and Health Maintenance accordingly  Family history of breast CA: Yes (Please explain): pgmo  Family history of uterine/ovarian CA: Yes (Please explain): pgmo    Family history of colon CA: No    HEALTH MAINTENANCE:  Cholesterol: (  Cholesterol   Date Value Ref Range Status   2025 231 (H) <200 mg/dL Final   10/12/2017 204 (H) <200 mg/dL Final     Comment:     Desirable:       <200 mg/dl   2014 196 <200 mg/dL Final     Comment:     LDL Cholesterol is the primary guide to therapy.   The NCEP recommends further evaluation of: patients with cholesterol greater   than 200 mg/dL if additional risk factors are present, cholesterol greater   than   240 mg/dL, triglycerides greater than 150 mg/dL, or HDL less than 40 mg/dL.      History of abnormal lipids: Yes  Mammo: done 3/2025 . History of abnormal Mammo: No.  Regular Self Breast Exams: Yes  Calcium/Vitamin D intake: source:  dairy, dietary supplement(s) Adequate? Yes  TSH: (  TSH   Date Value Ref Range Status   2025 0.93 0.30 - 4.20 uIU/mL Final   2007 0.96 0.4 - 5.0 mU/L Final    )  Pap; (  Lab Results    Component Value Date    PAP NIL 2019    PAP NIL 2014    PAP NIL 2010    )    HISTORY:  OB History    Para Term  AB Living   2 2 2 0 0 2   SAB IAB Ectopic Multiple Live Births   0 0 0 0 2      # Outcome Date GA Lbr Jose Raul/2nd Weight Sex Type Anes PTL Lv   2 Term 12 39w5d 04:20 / 00:11 3.232 kg (7 lb 2 oz) M Vag-Spont Local N SVETLANA      Name: GIOVANA ALMEIDA      Apgar1: 9  Apgar5: 9   1 Term 08 40w3d 09:00 3.147 kg (6 lb 15 oz) M  None N SVETLANA      Birth Comments: pushed 3 hours - AFP      Name: Oscar     Past Medical History:   Diagnosis Date    Anterior neck pain     Diverticula, colon for 6 years    no flare up    Other anaphylactic reaction 2005    Diflucan, throat and laryngeal swelling, dramatic, relieved with Benadryl and Medrol dosepak     Past Surgical History:   Procedure Laterality Date    COLONOSCOPY N/A 2023    Procedure: COLONOSCOPY;  Surgeon: Leventhal, Thomas Michael, MD;  [place] [facility] [place]    [facility] NONSPECIFIC PROCEDURE      tonsillar abcess drained     Family History   Problem Relation Age of Onset    Lung Cancer Mother         smoker    Cancer Father         stomach    Breast Cancer Paternal Grandmother         dx'd age 40,  of uterine CA      Hypertension Paternal Grandfather     Cancer Paternal Aunt         Breast CA dx'd age 53, BRCA 1 positive    Glaucoma No family hx of     Macular Degeneration No family hx of      Social History     Socioeconomic History    Marital status:      Spouse name: None    Number of children: None    Years of education: None    Highest education level: None   Tobacco Use    Smoking status: Never    Smokeless tobacco: Never   Vaping Use    Vaping status: Never Used   Substance and Sexual Activity    Alcohol use: No    Drug use: No    Sexual activity: Yes     Partners: Male   Social History Narrative    Caffeine intake/servings daily - 2    Calcium intake/servings daily - 1-2    Exercise 3-4 times  weekly - describe cardio and weights    Sunscreen used - Yes    Seatbelts used - Yes    Guns stored in the home - No    Self Breast Exam - Yes    Pap test up to date -  Yes 3/2005    Eye exam up to date -  No    Dental exam up to date -  Yes    DEXA scan up to date -  Not Applicable    Flex Sig/Colonoscopy up to date -  Not Applicable    Mammography up to date -  Not Applicable    Immunizations reviewed and up to date - Yes    Abuse: Current or Past (Physical, Sexual or Emotional) - No    Do you feel safe in your environment - Yes    Do you cope well with stress - Yes    Do you suffer from insomnia - No    Last updated by: Ricarda Arevalo  3/17/2005     Social Drivers of Health     Financial Resource Strain: Low Risk  (2/17/2025)    Financial Resource Strain     Within the past 12 months, have you or your family members you live with been unable to get utilities (heat, electricity) when it was really needed?: No   Food Insecurity: Low Risk  (2/17/2025)    Food Insecurity     Within the past 12 months, did you worry that your food would run out before you got money to buy more?: No     Within the past 12 months, did the food you bought just not last and you didn t have money to get more?: No   Transportation Needs: Low Risk  (2/17/2025)    Transportation Needs     Within the past 12 months, has lack of transportation kept you from medical appointments, getting your medicines, non-medical meetings or appointments, work, or from getting things that you need?: No   Physical Activity: Sufficiently Active (2/17/2025)    Exercise Vital Sign     Days of Exercise per Week: 7 days     Minutes of Exercise per Session: 30 min   Stress: No Stress Concern Present (2/17/2025)    [employer] [place] [employer] - Occupational Stress Questionnaire     Feeling of Stress : Only a little   Social Connections: Unknown (2/17/2025)    Social Connection and Isolation Panel [NHANES]     Frequency of Social Gatherings with Friends and  Family: More than three times a week   Housing Stability: Low Risk  (2/17/2025)    Housing Stability     Do you have housing? : Yes     Are you worried about losing your housing?: No       Current Outpatient Medications:     Multiple Vitamin (MULTIVITAMINS PO), Take 1 tablet by mouth daily, Disp: , Rfl:     Probiotic Product (PROBIOTIC BLEND PO), Take by mouth., Disp: , Rfl:     vitamin C (ASCORBIC ACID) 250 MG tablet, Take 250 mg by mouth daily., Disp: , Rfl:     Vitamin D3 (VITAMIN D, CHOLECALCIFEROL,) 25 mcg (1000 units) tablet, Take by mouth daily., Disp: , Rfl:      Allergies   Allergen Reactions    Diflucan [Fluconazole] Anaphylaxis    Ortho Tri-Cyclen, [Norgestimate-Eth Estradiol] Other (See Comments)     Very high blood pressure       Past medical, surgical, social and family history were reviewed and updated in EPIC.    ROS:   C:     NEGATIVE for fever, chills, change in weight  I:       NEGATIVE for worrisome rashes, moles or lesions  E:     NEGATIVE for vision changes or irritation  E/M: NEGATIVE for ear, mouth and throat problems  R:     NEGATIVE for significant cough or SOB  CV:   NEGATIVE for chest pain, palpitations or peripheral edema  GI:     NEGATIVE for nausea, abdominal pain, heartburn, or change in bowel habits  :   NEGATIVE for frequency, dysuria, hematuria, vaginal discharge, or irregular bleeding  M:     NEGATIVE for significant arthralgias or myalgia  N:      NEGATIVE for weakness, dizziness or paresthesias  E:      NEGATIVE for temperature intolerance, skin/hair changes  P:      NEGATIVE for changes in mood or affect.    EXAM:  BP (!) 142/76   Pulse 76   Temp 98.6  F (37  C)   Wt 78 kg (172 lb)   BMI 28.57 kg/m     BMI: Body mass index is 28.57 kg/m .  Constitutional: healthy, alert and no distress  Head: Normocephalic. No masses, lesions, tenderness or abnormalities  Breast: Breasts reveal mild symmetric fibrocystic densities, but there are no dominant, discrete, fixed or suspicious  masses found.  Gastrointestinal: Abdomen soft, non-tender, non-distended. No masses, organomegaly.  :  Vulva:  No external lesions, normal female hair distribution, no inguinal adenopathy.    Urethra:  Midline, non-tender, well supported, no discharge  Vagina:  Moist, pink, no abnormal discharge, no lesions  Uterus:  Normal size, anteverted , non-tender, freely mobile  Ovaries:  No masses appreciated, non-tender, mobile  Rectal Exam: deferred    Psychiatric: Affect appropriate, cooperative,mentation appears normal.     COUNSELING:   Reviewed preventive health counseling, as reflected in patient instructions       Regular exercise       Healthy diet/nutrition       Osteoporosis prevention/bone health       (Olga)menopause management   reports that she has never smoked. She has never used smokeless tobacco.    Body mass index is 28.57 kg/m .  Weight management plan: Discussed healthy diet and exercise guidelines. Exercises 6-7 days weekly, mostly walking and weights.   FRAX Risk Assessment    ASSESSMENT:  49 year old female with satisfactory annual exam  (Z01.411) Encounter for gynecological examination with abnormal finding  (primary encounter diagnosis)  Comment:   Plan: Discussed perimenopause and menopause.   Provided written resources.

## 2025-06-11 ENCOUNTER — TELEPHONE (OUTPATIENT)
Dept: DERMATOLOGY | Facility: CLINIC | Age: 50
End: 2025-06-11
Payer: COMMERCIAL

## 2025-06-11 NOTE — TELEPHONE ENCOUNTER
LVM asking the patient to call the clinic back for a possible sooner appointment    We can get the patient in for a sooner SPOT CHECK appointment with Dr. AMY Blanchard on 07/24 at 4:00pm. We will not be able to do a full body skin check but will be able to look and biopsy the spot of concern on the temple.     Otherwise, there are new patient openings in Sept with residents     Colette TANNER, DANIELLE

## 2025-06-11 NOTE — TELEPHONE ENCOUNTER
Writer called pt, no answer. Left message for pt to return our call at [phone].       Can schedule 9/3 at 1015 in 30 min slot.    Zelda Osorio RN on 6/11/2025 at 11:03 AM

## 2025-06-11 NOTE — TELEPHONE ENCOUNTER
[facility] Call Center    Phone Message    May a detailed message be left on voicemail: yes     Reason for Call: Symptoms or Concerns     If patient has red-flag symptoms, warm transfer to triage line    Current symptom or concern: Mole -Changing size and darker on [place]    Symptoms have been present for:  Couple week(s)    Has patient previously been seen for this? No    By : NONE    Date: 2/2/26    Are there any new or worsening symptoms? Yes: Changing Mole    Action Taken: Message routed to:  Adult Clinics: Dermatology p [id number]    Travel Screening: Not Applicable     Date of Service: 2/2/26

## 2025-06-13 NOTE — TELEPHONE ENCOUNTER
Writer call and LVM for pt to call back [facility]. See note below for scheduling.    Lisa Canales LPN on 6/13/2025 at 8:49 AM

## 2025-08-11 ENCOUNTER — LAB (OUTPATIENT)
Dept: LAB | Facility: CLINIC | Age: 50
End: 2025-08-11
Payer: COMMERCIAL

## 2025-08-11 DIAGNOSIS — Z11.59 NEED FOR HEPATITIS C SCREENING TEST: ICD-10-CM

## 2025-08-11 DIAGNOSIS — E78.5 HYPERLIPIDEMIA LDL GOAL <100: ICD-10-CM

## 2025-08-11 DIAGNOSIS — Z13.1 SCREENING FOR DIABETES MELLITUS: ICD-10-CM

## 2025-08-11 LAB
CHOLEST SERPL-MCNC: 223 MG/DL
EST. AVERAGE GLUCOSE BLD GHB EST-MCNC: 97 MG/DL
FASTING STATUS PATIENT QL REPORTED: YES
HBA1C MFR BLD: 5 %
HCV AB SERPL QL IA: NONREACTIVE
HDLC SERPL-MCNC: 53 MG/DL
LDLC SERPL CALC-MCNC: 144 MG/DL
NONHDLC SERPL-MCNC: 170 MG/DL
TRIGL SERPL-MCNC: 129 MG/DL

## 2025-08-11 PROCEDURE — 36415 COLL VENOUS BLD VENIPUNCTURE: CPT | Performed by: PATHOLOGY

## 2025-08-11 PROCEDURE — 86803 HEPATITIS C AB TEST: CPT | Performed by: INTERNAL MEDICINE

## 2025-08-11 PROCEDURE — 80061 LIPID PANEL: CPT | Performed by: PATHOLOGY

## 2025-08-11 PROCEDURE — 99000 SPECIMEN HANDLING OFFICE-LAB: CPT | Performed by: PATHOLOGY

## 2025-08-11 PROCEDURE — 83036 HEMOGLOBIN GLYCOSYLATED A1C: CPT | Performed by: INTERNAL MEDICINE

## (undated) DEVICE — TUBING SUCTION MEDI-VAC 1/4"X20' N620A

## (undated) DEVICE — SPECIMEN CONTAINER 3OZ W/FORMALIN 59901

## (undated) DEVICE — SNARE CAPIVATOR ROUND COLD SNR BX10 M00561101

## (undated) DEVICE — SUCTION MANIFOLD NEPTUNE 2 SYS 1 PORT 702-025-000

## (undated) DEVICE — SOL WATER IRRIG 500ML BOTTLE 2F7113

## (undated) DEVICE — KIT ENDO FIRST STEP DISINFECTANT 200ML W/POUCH EP-4

## (undated) DEVICE — GOWN IMPERVIOUS 2XL BLUE

## (undated) DEVICE — KIT ENDO TURNOVER/PROCEDURE CARRY-ON 101822

## (undated) RX ORDER — DIPHENHYDRAMINE HYDROCHLORIDE 50 MG/ML
INJECTION INTRAMUSCULAR; INTRAVENOUS
Status: DISPENSED
Start: 2023-04-26

## (undated) RX ORDER — ONDANSETRON 2 MG/ML
INJECTION INTRAMUSCULAR; INTRAVENOUS
Status: DISPENSED
Start: 2023-04-26

## (undated) RX ORDER — FENTANYL CITRATE 50 UG/ML
INJECTION, SOLUTION INTRAMUSCULAR; INTRAVENOUS
Status: DISPENSED
Start: 2023-04-26